# Patient Record
Sex: MALE | Race: ASIAN | NOT HISPANIC OR LATINO | Employment: OTHER | URBAN - METROPOLITAN AREA
[De-identification: names, ages, dates, MRNs, and addresses within clinical notes are randomized per-mention and may not be internally consistent; named-entity substitution may affect disease eponyms.]

---

## 2017-05-09 ENCOUNTER — TELEPHONE (OUTPATIENT)
Dept: INTERNAL MEDICINE | Facility: CLINIC | Age: 52
End: 2017-05-09

## 2017-05-09 ENCOUNTER — OFFICE VISIT (OUTPATIENT)
Dept: INTERNAL MEDICINE | Facility: CLINIC | Age: 52
End: 2017-05-09
Payer: MEDICARE

## 2017-05-09 VITALS — DIASTOLIC BLOOD PRESSURE: 54 MMHG | SYSTOLIC BLOOD PRESSURE: 77 MMHG | TEMPERATURE: 97 F | HEART RATE: 92 BPM

## 2017-05-09 DIAGNOSIS — G71.00 MUSCULAR DYSTROPHY: ICD-10-CM

## 2017-05-09 DIAGNOSIS — L89.213 DECUBITUS ULCER OF RIGHT HIP, STAGE 3: Primary | ICD-10-CM

## 2017-05-09 PROCEDURE — 99999 PR PBB SHADOW E&M-EST. PATIENT-LVL III: CPT | Mod: PBBFAC,,, | Performed by: FAMILY MEDICINE

## 2017-05-09 PROCEDURE — 1160F RVW MEDS BY RX/DR IN RCRD: CPT | Mod: S$GLB,,, | Performed by: FAMILY MEDICINE

## 2017-05-09 PROCEDURE — 99499 UNLISTED E&M SERVICE: CPT | Mod: S$GLB,,, | Performed by: FAMILY MEDICINE

## 2017-05-09 PROCEDURE — 99204 OFFICE O/P NEW MOD 45 MIN: CPT | Mod: S$GLB,,, | Performed by: FAMILY MEDICINE

## 2017-05-09 NOTE — TELEPHONE ENCOUNTER
----- Message from Анна Cosme sent at 5/9/2017  3:33 PM CDT -----  Contact: [facility]  [phone]  Received a referral and they are unable to accept it because they are not in network with the pt's insurance.

## 2017-05-09 NOTE — PROGRESS NOTES
Subjective:       Patient ID: Godwin Jeter is a 51 y.o. male.    Chief Complaint: bed sores and Annual Exam    HPI Comments: He is a new patient, here with his brother and his mother.  His brother acts as the  for the mother. Pt is 49 yo pt with muscular dystrophy, bedbound, who lives at home and is cared for by family, primarily his mother. He had two ED visits in April and June of last year to [facility] which are reivewed. Apparently he was treated at [facility] in March 2016 for fecal impaction and urinary retention and had indwelling catheter for a short period of time. The June visit was for decubitus ulcer. No HH was available after that encounter but the family was able to heal his wounds with their own care. Now he has a persistent ulcer to right hip. They are looking for help with this, but even more, they are looking for help with pt's care. He has 32 hours of  care weekly. The mother is exhausted. She is here today as a pt herself c/o extreme fatigue and other symptoms. They also wonder if they could obtain some kind of reclining wheelchair that would aid in their ability to care for him.    Pt requires all care - he must be fed, bathed, turned. They report there have been no recent changes in his status - no change in appetite, swallowing ability, bowel or bladder habits (he is in depends 24 hours), no temps, no weight change, no URI symptoms, no breathing difficulty. His brother is able to carry the pt and that is how he was transported into the clinic today.    Review of Systems   Constitutional: Negative for activity change, appetite change and fever.   Respiratory: Negative for choking and shortness of breath.    Skin: Positive for wound (bed sores chronic).       Objective:      Physical Exam   Constitutional:   Cachectic individual lying on exam table. Contractures to upper and lower limbs.   HENT:   Head: Normocephalic.   Cardiovascular: Normal rate, regular rhythm and normal  heart sounds.  Exam reveals no friction rub.    No murmur heard.  Pulmonary/Chest: Effort normal and breath sounds normal. No respiratory distress. He has no wheezes.   Abdominal: Soft. He exhibits no distension. There is no tenderness. There is no rebound and no guarding.   Skin: Skin is warm and dry.   Overlying right greater trochanter, full thickness 1.2 x 1.4 cm circular skin defect. No surrounding erythema. To left hip, healed scar from prior decubitus ulcer.         Assessment/Plan:     1. Decubitus ulcer of right hip, stage 3  Ambulatory referral to Home Health   2. Muscular dystrophy  Ambulatory referral to Home Health     HH referral for wound care as well as for medical SW assistance with above requests - reclining wheelchair, any help with further care.  HH orders include request for lab - CBC and CMP - to assess nutritional status.   Pt declined to have blood drawn today due to fatigue and discomfort.

## 2017-05-09 NOTE — MR AVS SNAPSHOT
[address]  [place] [address]  Phone: [phone]  Fax: [phone]                  Godwin Jeter   2017 1:40 PM   Office Visit    Description:  Male : 1965   Provider:  Camille Smallwood MD   Department:  [facility]           Reason for Visit     bed sores     Annual Exam           Diagnoses this Visit        Comments    Decubitus ulcer of right hip, stage 3    -  Primary     Muscular dystrophy                To Do List           Goals (5 Years of Data)     None      [facility]     [facility] On Call Nurse Care Line -  Assistance  Unless otherwise directed by your provider, please contact [facility], our nurse care line that is available for  assistance.     Registered nurses in the [facility] Center provide: appointment scheduling, clinical advisement, health education, and other advisory services.  Call: [phone] (toll free)               Medications           Message regarding Medications     Verify the changes and/or additions to your medication regime listed below are the same as discussed with your clinician today.  If any of these changes or additions are incorrect, please notify your healthcare provider.             Verify that the below list of medications is an accurate representation of the medications you are currently taking.  If none reported, the list may be blank. If incorrect, please contact your healthcare provider. Carry this list with you in case of emergency.           Current Medications     bisacodyl (DULCOLAX) 10 mg Supp Place 1 suppository (10 mg total) rectally daily as needed.    metoclopramide HCl (REGLAN) 5 MG tablet Take 5 mg by mouth 4 (four) times daily.           Clinical Reference Information           Your Vitals Were     BP Pulse Temp             77/54 (BP Location: Left arm, Patient Position: Lying, BP Method: Automatic) 92 97.2 °F (36.2 °C) (Tympanic)         Blood Pressure           Most Recent Value    BP  (!)  77/54      Allergies as of 5/9/2017     No Known Allergies      Immunizations Administered on Date of Encounter - 5/9/2017     None      Orders Placed During Today's Visit      Normal Orders This Visit    Ambulatory referral to Home Health       [facility] Sign-Up     Activating your [facility] account is as easy as 1-2-3!     1) Visit [email or web address], select Sign Up Now, enter this activation code and your date of birth, then select Next.  [email or web address]  Expires: 6/23/2017  3:31 PM      2) Create a username and password to use when you visit [facility] in the future and select a security question in case you lose your password and select Next.    3) Enter your e-mail address and click Sign Up!    Additional Information  If you have questions, please e-mail [email or web address] or call [phone] to talk to our [facility] staff. Remember, [facility] is NOT to be used for urgent needs. For medical emergencies, dial 911.         Language Assistance Services     ATTENTION: Language assistance services are available, free of charge. Please call [phone].      ATENCIÓN: Si habla adonisañol, tiene a douglas disposición servicios gratuitos de asistencia lingüística. Llame al [phone].     CHÚ Ý: N?u b?n nói Ti?ng Vi?t, có các d?ch v? h? tr? ngôn ng? mi?n phí dành cho b?n. G?i s? [phone].         [facility] complies with applicable Federal civil rights laws and does not discriminate on the basis of race, color, national origin, age, disability, or sex.

## 2017-05-12 ENCOUNTER — TELEPHONE (OUTPATIENT)
Dept: INTERNAL MEDICINE | Facility: CLINIC | Age: 52
End: 2017-05-12

## 2017-05-12 NOTE — TELEPHONE ENCOUNTER
Winsome PANDYA from [facility] was calling in regards to Mr. Connelly. She states that  There ins't a signature in the orders and they need a  Signature to process his orders as soon as possible.    Please advise

## 2017-05-12 NOTE — TELEPHONE ENCOUNTER
----- Message from Laura Shearer sent at 5/12/2017  9:50 AM CDT -----  Contact: [facility] calling in regards to a referral for Home Health wound care but the signature was not on the order and they need the doctor to sign or send over a electronic signature in order for them to process this order as soon as possible fax# [phone] attn: [facility] TRIPLETT..[phone] directline

## 2017-05-15 ENCOUNTER — TELEPHONE (OUTPATIENT)
Dept: INTERNAL MEDICINE | Facility: CLINIC | Age: 52
End: 2017-05-15

## 2017-05-15 NOTE — TELEPHONE ENCOUNTER
[facility] approved Authorization number [id number]    [facility]

## 2017-06-05 ENCOUNTER — TELEPHONE (OUTPATIENT)
Dept: INTERNAL MEDICINE | Facility: CLINIC | Age: 52
End: 2017-06-05

## 2017-06-05 NOTE — TELEPHONE ENCOUNTER
Jeana from [facility] states that she went in to see the pt on today and noticed some neon green  Drainage from his wound as was just wanting to inform you of it.     Please Advise

## 2017-06-05 NOTE — TELEPHONE ENCOUNTER
----- Message from Quinaa Garber sent at 6/5/2017  4:11 PM CDT -----  Contact: gee from [facility] she is calling rg having some concerns on the wound. Has a neon green drainage from the wound and can be reached at [phone]//thanks/dbw

## 2017-06-05 NOTE — TELEPHONE ENCOUNTER
----- Message from Quiana Garber sent at 6/5/2017  4:11 PM CDT -----  Contact: gee from [facility] she is calling rg having some concerns on the wound. Has a neon green drainage from the wound and can be reached at [phone]//thanks/dbw

## 2017-06-05 NOTE — TELEPHONE ENCOUNTER
Jeana from [facility] was calling to inform you that Mr. Jeter has some neon colored drainage coming from his wound. She can be reached at [phone].

## 2017-06-06 NOTE — TELEPHONE ENCOUNTER
----- Message from Yaneth Pham sent at 6/6/2017  2:44 PM CDT -----  Heidi with [facility] at [phone]//states she had spoke with your office yesterday//nurse was suppose to call her back after speaking with the dr//has not heard anything yet//please call/thanks/[facility]

## 2017-06-06 NOTE — TELEPHONE ENCOUNTER
Spoke with jeana from [facility], she states that she was calling in regards to Mr. Connelly. I advised her that Dr. Smallwood was out of the office on yesterday and that I would make sure that she gets the message on today. Jeana verbalized understanding of information.

## 2017-06-06 NOTE — TELEPHONE ENCOUNTER
Ask HH: Any other S/S of infection such as erythema/ swelling? Has there been any improvement to wound?    Let me  Know.    Also have HH Review for me the wound care he is being given please - if wound is enlarging, we need to have him see wound care as outpt.

## 2017-06-06 NOTE — TELEPHONE ENCOUNTER
Wound has a neon green drainage, a little redness around it.  Other then the drainage there has been no changes.  Patient  Has no fever  Right now they are doing silver alginate  After cleaning with Sterile water then cover with dry 4x4

## 2017-06-07 ENCOUNTER — TELEPHONE (OUTPATIENT)
Dept: INTERNAL MEDICINE | Facility: CLINIC | Age: 52
End: 2017-06-07

## 2017-06-07 NOTE — TELEPHONE ENCOUNTER
----- Message from Kimmie Felipe sent at 6/7/2017  3:06 PM CDT -----  Contact: [facility]/ JAYCOB Thompson is calling nurse staff regarding orders for a urine test. Jaycob's call back to be advised today .[phone] THANKS

## 2017-06-08 ENCOUNTER — TELEPHONE (OUTPATIENT)
Dept: INTERNAL MEDICINE | Facility: CLINIC | Age: 52
End: 2017-06-08

## 2017-06-08 DIAGNOSIS — R35.0 URINARY FREQUENCY: Primary | ICD-10-CM

## 2017-06-08 DIAGNOSIS — L89.213 DECUBITUS ULCER OF RIGHT HIP, STAGE 3: ICD-10-CM

## 2017-06-08 NOTE — TELEPHONE ENCOUNTER
Orders placed for UA/CNS to be obtained by home health.  Picture measurements received via alternate method from home health regarding patient's decubitus ulcer.  Size of the ulceration has increased from my initial measurements.    Recommend sending to outpatient wound care, [facility] wound care.

## 2017-06-13 ENCOUNTER — TELEPHONE (OUTPATIENT)
Dept: INTERNAL MEDICINE | Facility: CLINIC | Age: 52
End: 2017-06-13

## 2017-06-13 DIAGNOSIS — L89.213 DECUBITUS ULCER OF RIGHT HIP, STAGE 3: Primary | ICD-10-CM

## 2017-06-13 DIAGNOSIS — N30.00 ACUTE CYSTITIS WITHOUT HEMATURIA: Primary | ICD-10-CM

## 2017-06-13 DIAGNOSIS — R35.0 URINARY FREQUENCY: ICD-10-CM

## 2017-06-13 DIAGNOSIS — G71.00 MUSCULAR DYSTROPHY: ICD-10-CM

## 2017-06-13 RX ORDER — SULFAMETHOXAZOLE AND TRIMETHOPRIM 800; 160 MG/1; MG/1
1 TABLET ORAL 2 TIMES DAILY
Qty: 10 TABLET | Refills: 0 | Status: SHIPPED | OUTPATIENT
Start: 2017-06-13 | End: 2017-06-18

## 2017-06-13 NOTE — TELEPHONE ENCOUNTER
Made several attempts to contact the [facility] location at [phone], leave messages and no one returned a call. Called the [place] location of advance wound care at [phone] and they stated to fax the referral to the referral line at [phone]

## 2017-06-13 NOTE — TELEPHONE ENCOUNTER
Okay to start antibiotics but only after home health obtains urine again.  Antibiotics will be called into his pharmacy.  Please inform family that he should not start the antibiotics until home health collects his urine a second time.  The first one was contaminated catch

## 2017-06-13 NOTE — TELEPHONE ENCOUNTER
Called and spoke with Jeana, give verbal orders for Lft's cbc, BMP and urine culture.  Faxed the orders to [phone].  Also give orders to start the abx after getting the urine sample

## 2017-06-13 NOTE — TELEPHONE ENCOUNTER
Please see prior phone call/messages.  Please inform home health that only the urine culture is required. I have canceled the blood work orders.  They were already done on 6/1/17.    I have the results from the 6/1/17 CMP showing all normal values with the exception of albumin slightly low at 3.4.  The CBC shows slight elevation of white cells at 11.1 normal H&H of 15/46.1 and normal platelets of 325.

## 2017-06-13 NOTE — TELEPHONE ENCOUNTER
Called and left a message for the  nurse to cancel the orders for the blood work, but to please get the urine culture

## 2017-06-13 NOTE — TELEPHONE ENCOUNTER
See referral for faxing    Also I have placed orders for labs to be drawn by HH - if he sees wound care promptly, these can be deferred to wound care, but HH can get them at their next visit if no visit to wound care is scheduled within a week.    Please notify HH and family that urine showed multiple colonies - contaminated or delayed transit time. A urine cultureis ordered again for HH to collect please.

## 2017-06-14 ENCOUNTER — TELEPHONE (OUTPATIENT)
Dept: INTERNAL MEDICINE | Facility: CLINIC | Age: 52
End: 2017-06-14

## 2017-06-14 DIAGNOSIS — R53.2 QUADRIPLEGIA, FUNCTIONAL: ICD-10-CM

## 2017-06-14 DIAGNOSIS — G71.00 MUSCULAR DYSTROPHY: Primary | ICD-10-CM

## 2017-06-14 NOTE — TELEPHONE ENCOUNTER
If you can figure out how to order it in system, great - I am unsuccessful thus far.   Contact [facility] please to see how to do it. Thanks.

## 2017-06-14 NOTE — TELEPHONE ENCOUNTER
Monday is his appointment, needs an order for a ambulance transport.  The office is on the third floor

## 2017-06-14 NOTE — TELEPHONE ENCOUNTER
----- Message from Johanna Juárez sent at 6/14/2017  1:33 PM CDT -----  Contact: Patient's sister Kamilah  Ms Kamilah is states her brother needs an order for an ambulance to take him to wound care, please call her back at [phone]. Thank you

## 2017-06-15 ENCOUNTER — TELEPHONE (OUTPATIENT)
Dept: INTERNAL MEDICINE | Facility: CLINIC | Age: 52
End: 2017-06-15

## 2017-06-15 NOTE — TELEPHONE ENCOUNTER
Called and spoke with the sister in law, she has not set up the transportation yet.  Advised her to call and get it set up

## 2017-06-15 NOTE — TELEPHONE ENCOUNTER
----- Message from Alexis Mackay sent at 6/15/2017  9:44 AM CDT -----  Kamilah Jose Rafael, sister in law, #[phone] is returning a missed call.

## 2017-06-15 NOTE — TELEPHONE ENCOUNTER
----- Message from Laura Shearer sent at 6/15/2017 12:15 PM CDT -----  Contact: sister-in-law  Pt sister-in-law is returning Dr Smallwood phone call concerning schedule appointment and need authorization number.....[phone]

## 2017-06-15 NOTE — TELEPHONE ENCOUNTER
Called and left a message for the sister in law stating that I need the name of the ambulance service they will be using

## 2017-06-16 ENCOUNTER — TELEPHONE (OUTPATIENT)
Dept: INTERNAL MEDICINE | Facility: CLINIC | Age: 52
End: 2017-06-16

## 2017-06-30 ENCOUNTER — TELEPHONE (OUTPATIENT)
Dept: INTERNAL MEDICINE | Facility: CLINIC | Age: 52
End: 2017-06-30

## 2017-06-30 NOTE — TELEPHONE ENCOUNTER
Called and spoke with the sister in law.  His appointment for wound care will be Monday at the [place] wound care clinic

## 2017-06-30 NOTE — TELEPHONE ENCOUNTER
----- Message from Rene Joseph sent at 6/29/2017  6:45 PM CDT -----  Contact: Patient  Patient is requesting that for an order to be put in for an ambulance transportation from his wound care clinic. Please contact Patient's Sister In Law at [phone].

## 2017-06-30 NOTE — TELEPHONE ENCOUNTER
Patient was treated empirically with a five-day course of BID Bactrim DS starting 6/13.  I'm unable to find a record of the culture from that initial urinalysis.  Received repeat posttreatment urinalysis collected today 6/30/17 showing positive nitrites, moderate leukocyte esterase, 10-20 white blood cells, 0-1 blood, 2-3 epis, large bacteria.  A culture is being run.  Patient is bedbound and wears a diaper.  The specimens were collected with a condom catheter by the brother.    I spoke with [facility] telephone [phone], fax [phone] this evening and recommended that no repeat antibiotic be prescribed until we have the culture results.  I also advised her I will be out of town until next Friday, 7/7/17.    (The reason the initial UA/C and S was obtained was that the brother had noted bad odor and more frequency to the patient's urination.)    I will address next week when I return, or another abx can be rxd based on the culture report if it comes in earlier.

## 2017-06-30 NOTE — TELEPHONE ENCOUNTER
----- Message from Lesley Michel sent at 6/30/2017 10:31 AM CDT -----  Contact: [facility] [phone]  States that she is calling regarding a coverage decision request. For additional information you will need Member name, ID #[id number] and Auth # [id number]. States that pt was approved for ambulance transportation. Please call back at [phone]//thank you acc

## 2017-07-05 ENCOUNTER — TELEPHONE (OUTPATIENT)
Dept: INTERNAL MEDICINE | Facility: CLINIC | Age: 52
End: 2017-07-05

## 2017-07-05 NOTE — TELEPHONE ENCOUNTER
----- Message from Kina Barton sent at 7/5/2017  8:41 AM CDT -----  Contact: [facility]  Summer called to speak with the nurse about a UA test. She can be contacted at [phone].    Thanks,  Kina

## 2017-07-10 ENCOUNTER — TELEPHONE (OUTPATIENT)
Dept: INTERNAL MEDICINE | Facility: CLINIC | Age: 52
End: 2017-07-10

## 2017-07-10 NOTE — TELEPHONE ENCOUNTER
Urine culture collected 6/30/17 results received.  Results show greater than 100,000 gram-negative rods with mixed morphotypes present, greater than 100,000 gram-positive cocci, and 50,000-100,000 mixed urogenital skin monika.  No sensitivities received.  Note that 3 or more organisms usually indicates contamination during collection or overgrowth due to excessive transport time per report.    This is a repeat test with similar results shown on prior culture.    Please contact pt caregivers re: Antibiotics were called in to pt pharmacy on 6/13/17 after the first culture results rec'd. The abx were not to be started until after the repeat urine cx was collected. Please confirm that they have started the abx after collection of second specimen.

## 2017-07-10 NOTE — TELEPHONE ENCOUNTER
Yes he started on the abx, sister in law believed it was started after the collection of the second urine

## 2017-07-21 ENCOUNTER — TELEPHONE (OUTPATIENT)
Dept: INTERNAL MEDICINE | Facility: CLINIC | Age: 52
End: 2017-07-21

## 2017-07-21 NOTE — TELEPHONE ENCOUNTER
----- Message from Na Escamilla sent at 7/21/2017 11:29 AM CDT -----  Contact: sister in law  States pt need a order for ambulance service for Monday 7/24, pt is going to wound care clinic, please call Kamilah @  [phone] when appt is schedule.

## 2017-07-24 ENCOUNTER — TELEPHONE (OUTPATIENT)
Dept: INTERNAL MEDICINE | Facility: CLINIC | Age: 52
End: 2017-07-24

## 2017-07-24 NOTE — TELEPHONE ENCOUNTER
----- Message from Stephanie Mackay sent at 7/24/2017 10:54 AM CDT -----  [facility]  Calling from [employer] regarding a coverage decision. States the authorization does not meet the request for expedited, but it is approved. The authorization code is [id number]. Please adv/call [phone].//thanks. cw

## 2017-07-24 NOTE — TELEPHONE ENCOUNTER
Spoke with [facility] and states that it was approved, states that Thiago will get in touch with pt and get more information of when services will be needed and where to go.

## 2017-07-24 NOTE — TELEPHONE ENCOUNTER
Left a message for a member of [facility] to return the call in regards to Elaina Steffany.

## 2017-07-24 NOTE — TELEPHONE ENCOUNTER
----- Message from Clarisa Rdz sent at 7/21/2017  3:59 PM CDT -----  Contact: [facility]  please call back at [phone] regarding  Ambulance womb care.

## 2017-07-26 ENCOUNTER — TELEPHONE (OUTPATIENT)
Dept: INTERNAL MEDICINE | Facility: CLINIC | Age: 52
End: 2017-07-26

## 2017-07-26 NOTE — TELEPHONE ENCOUNTER
----- Message from Alexis Mackay sent at 7/26/2017  1:20 PM CDT -----  Marti, [facility], #[phone], states she is calling to check on an order she dropped off on 7/5 to re-cert the pt's home health from 6/30/17.

## 2017-08-11 ENCOUNTER — TELEPHONE (OUTPATIENT)
Dept: INTERNAL MEDICINE | Facility: CLINIC | Age: 52
End: 2017-08-11

## 2017-08-11 NOTE — TELEPHONE ENCOUNTER
Called and spoke with the sister in law and explained that we may not be able to get it done today.  She is calling to see if she can get the appointment rescheduled

## 2017-08-11 NOTE — TELEPHONE ENCOUNTER
----- Message from Khalida Oconnor sent at 8/11/2017 12:18 PM CDT -----  Contact: pt   Pt needs a order for pt to get an ambulance to pick him up on 08/21 for him to go to wound care clinic,,, please call back  [phone]

## 2017-08-11 NOTE — TELEPHONE ENCOUNTER
----- Message from Carmelita Dutta sent at 8/11/2017 10:54 AM CDT -----  Contact: Kamilah (pt's sister)   Kamilah called and stated she needed to speak to the nurse. She stated that the pt needs an order for an ambulance for 8/14/17 to pick him up for wound care. She can be reached at [phone].    Thanks,  TF

## 2017-08-18 ENCOUNTER — TELEPHONE (OUTPATIENT)
Dept: INTERNAL MEDICINE | Facility: CLINIC | Age: 52
End: 2017-08-18

## 2017-08-18 NOTE — TELEPHONE ENCOUNTER
----- Message from Guillermina Gibbs sent at 8/18/2017 12:32 PM CDT -----  Contact: Oscar naiduen - brother  He states that he was returning your call.   Call him at [phone].                                             higginbotham

## 2017-08-18 NOTE — TELEPHONE ENCOUNTER
They were calling to see if the ambulance transportation has been approved.  Advised to call the insurance company

## 2017-08-18 NOTE — TELEPHONE ENCOUNTER
----- Message from Myla Jiménez sent at 8/18/2017 10:56 AM CDT -----  Contact: [facility]ber-[phone]   Pt would like to  Consult with the nurse about wound clinic.  Please call back at [phone].  Thx-AMh

## 2018-03-26 ENCOUNTER — TELEPHONE (OUTPATIENT)
Dept: INTERNAL MEDICINE | Facility: CLINIC | Age: 53
End: 2018-03-26

## 2018-03-26 DIAGNOSIS — G71.00 MUSCULAR DYSTROPHY: Primary | ICD-10-CM

## 2018-03-26 DIAGNOSIS — R53.2 QUADRIPLEGIA, FUNCTIONAL: ICD-10-CM

## 2018-03-26 DIAGNOSIS — L89.90 PRESSURE ULCER, UNSPECIFIED LOCATION, UNSPECIFIED ULCER STAGE: ICD-10-CM

## 2018-03-26 NOTE — TELEPHONE ENCOUNTER
Patient's family is requesting a hospital bed, wheelchair and HH.  Family states that the patient is bed bound ( brother hurt his back and is unable to lift the patient ) he also has another sore stating on his hip.   Please advise

## 2018-03-26 NOTE — TELEPHONE ENCOUNTER
Spoke with the patient sister, she states that she will have her sister in law call the  Office back to give more information about the patient and wether or not he is receiving home health.

## 2018-03-26 NOTE — TELEPHONE ENCOUNTER
The patient is not receiving any Home health Nurse.   The brother who works for [employer] ( care taker) is all they have.

## 2018-03-26 NOTE — TELEPHONE ENCOUNTER
----- Message from Gwen Bejarano sent at 3/26/2018  2:06 PM CDT -----  Contact: Pt's sister  She is calling in regards to a missed call and can be reached at [phone]

## 2018-03-26 NOTE — TELEPHONE ENCOUNTER
Verify that pt does not receive HH- he was referred to HH last May but as I recall he did not use it. I need to know before I can proceed.

## 2018-03-27 NOTE — TELEPHONE ENCOUNTER
----- Message from Yolanda Ontiveros sent at 3/27/2018  9:24 AM CDT -----  Contact: Kamilah sister in law  Returning your call. please call Lee @ [phone]. Thanks, shaheen

## 2018-03-27 NOTE — TELEPHONE ENCOUNTER
Noted. Please include the recent phone messages from family requesting hospital bed and presence of new wound.

## 2018-03-27 NOTE — TELEPHONE ENCOUNTER
[facility]'t take [facility],  [facility] take it case by case   Will fax over the information to them.

## 2018-03-27 NOTE — TELEPHONE ENCOUNTER
Please call the [facility] - I am unable to place a new order due to presence of current referral - 5/9/17 - to HH - can they assess pt - make recs for bed etc? Asking for HH assessment for nursing care for decubitus ulcer; MD; quadriplegia, functional; for wound care, for SW assessment, for eval for aide.

## 2018-03-28 ENCOUNTER — TELEPHONE (OUTPATIENT)
Dept: INTERNAL MEDICINE | Facility: CLINIC | Age: 53
End: 2018-03-28

## 2018-03-28 NOTE — TELEPHONE ENCOUNTER
[facility]   Called and left a message for the sister in law to call the insurance company and see who is in network and to let me know

## 2018-04-20 ENCOUNTER — TELEPHONE (OUTPATIENT)
Dept: INTERNAL MEDICINE | Facility: CLINIC | Age: 53
End: 2018-04-20

## 2018-04-20 NOTE — TELEPHONE ENCOUNTER
Patient family requested that we contact [facility]. ( Ms. Cortés ) at [phone]    Called and spoke with her and she stated that she just helps the family with referrals.  Explained to her that we can't order supplies when we dont know what he needs.  Please contact the family and have them find out who is in network with there insurance company for HH.  Never heard but from anyone.   Family has been told this several times

## 2018-08-07 ENCOUNTER — TELEPHONE (OUTPATIENT)
Dept: INTERNAL MEDICINE | Facility: CLINIC | Age: 53
End: 2018-08-07

## 2018-08-07 NOTE — TELEPHONE ENCOUNTER
Mother and sister here for mother's visit - are asking about supplies - last asked back in March - April. We were going to have HH go and assess via their PT/OT for a transport surface. They did not understand the next step they were asked at that time to let us know which HH is covered.    They are also asking for a lift to place him in a tub. From bed and transport to tub and back.    [facility] us [facility]

## 2018-10-02 ENCOUNTER — TELEPHONE (OUTPATIENT)
Dept: INTERNAL MEDICINE | Facility: CLINIC | Age: 53
End: 2018-10-02

## 2018-10-02 DIAGNOSIS — R53.2 QUADRIPLEGIA, FUNCTIONAL: ICD-10-CM

## 2018-10-02 DIAGNOSIS — G71.00 MUSCULAR DYSTROPHY: Primary | ICD-10-CM

## 2018-10-02 NOTE — TELEPHONE ENCOUNTER
Mother and sister asking for Sahra lift - he was assessed for LT PCA and phone to call [phone] [facility] [address] [place] [address]. He has 32 hours/wk PCA but trying to get longer hours. To call for waiver slot placement and how to expedite.    Also still asking for wheelchair - will need customized features or at least padding to keep head in place and chair needs to lie flat or on a slant.    Staff: Sahra melissa ordered - please contact [facility] to see where it needs to be sent.

## 2018-10-02 NOTE — TELEPHONE ENCOUNTER
Called [employer].  Spoke to Volodymyr and was advised that order can be faxed to [phone].    Spoke to [facility] and was advised that patient does not qualify because he is not in a nursing home or have Jeaneth Gehrig's disease.  Was given another number to call to get patient qualified: [phone] option# 5.  Called the number and was advised that someone will call the office back in regards to waiver slot placement and how to expedite.

## 2019-05-16 ENCOUNTER — HOSPITAL ENCOUNTER (EMERGENCY)
Facility: HOSPITAL | Age: 54
Discharge: HOME | End: 2019-05-16
Attending: EMERGENCY MEDICINE
Payer: MEDICARE

## 2019-05-16 VITALS
OXYGEN SATURATION: 99 % | HEART RATE: 101 BPM | RESPIRATION RATE: 16 BRPM | TEMPERATURE: 98 F | SYSTOLIC BLOOD PRESSURE: 109 MMHG | DIASTOLIC BLOOD PRESSURE: 59 MMHG

## 2019-05-16 DIAGNOSIS — K56.41 FECAL IMPACTION: Primary | ICD-10-CM

## 2019-05-16 DIAGNOSIS — G71.00 MUSCULAR DYSTROPHY: ICD-10-CM

## 2019-05-16 LAB
ALBUMIN SERPL BCP-MCNC: 3.3 G/DL (ref 3.5–5.2)
ALP SERPL-CCNC: 72 U/L (ref 55–135)
ALT SERPL W/O P-5'-P-CCNC: 9 U/L (ref 10–44)
ANION GAP SERPL CALC-SCNC: 11 MMOL/L (ref 8–16)
AST SERPL-CCNC: 16 U/L (ref 10–40)
BACTERIA #/AREA URNS HPF: NORMAL /HPF
BASOPHILS # BLD AUTO: 0.01 K/UL (ref 0–0.2)
BASOPHILS NFR BLD: 0.1 % (ref 0–1.9)
BILIRUB SERPL-MCNC: 0.5 MG/DL (ref 0.1–1)
BILIRUB UR QL STRIP: NEGATIVE
BUN SERPL-MCNC: 23 MG/DL (ref 6–20)
CALCIUM SERPL-MCNC: 9 MG/DL (ref 8.7–10.5)
CHLORIDE SERPL-SCNC: 107 MMOL/L (ref 95–110)
CLARITY UR: CLEAR
CO2 SERPL-SCNC: 22 MMOL/L (ref 23–29)
COLOR UR: YELLOW
CREAT SERPL-MCNC: 0.9 MG/DL (ref 0.5–1.4)
DIFFERENTIAL METHOD: ABNORMAL
EOSINOPHIL # BLD AUTO: 0 K/UL (ref 0–0.5)
EOSINOPHIL NFR BLD: 0.2 % (ref 0–8)
ERYTHROCYTE [DISTWIDTH] IN BLOOD BY AUTOMATED COUNT: 12.7 % (ref 11.5–14.5)
EST. GFR  (AFRICAN AMERICAN): >60 ML/MIN/1.73 M^2
EST. GFR  (NON AFRICAN AMERICAN): >60 ML/MIN/1.73 M^2
GLUCOSE SERPL-MCNC: 160 MG/DL (ref 70–110)
GLUCOSE UR QL STRIP: NEGATIVE
HCT VFR BLD AUTO: 39.5 % (ref 40–54)
HGB BLD-MCNC: 13 G/DL (ref 14–18)
HGB UR QL STRIP: ABNORMAL
KETONES UR QL STRIP: NEGATIVE
LEUKOCYTE ESTERASE UR QL STRIP: NEGATIVE
LYMPHOCYTES # BLD AUTO: 0.9 K/UL (ref 1–4.8)
LYMPHOCYTES NFR BLD: 7.9 % (ref 18–48)
MCH RBC QN AUTO: 29.1 PG (ref 27–31)
MCHC RBC AUTO-ENTMCNC: 32.9 G/DL (ref 32–36)
MCV RBC AUTO: 89 FL (ref 82–98)
MICROSCOPIC COMMENT: NORMAL
MONOCYTES # BLD AUTO: 1.2 K/UL (ref 0.3–1)
MONOCYTES NFR BLD: 10.1 % (ref 4–15)
NEUTROPHILS # BLD AUTO: 9.4 K/UL (ref 1.8–7.7)
NEUTROPHILS NFR BLD: 81.7 % (ref 38–73)
NITRITE UR QL STRIP: NEGATIVE
PH UR STRIP: 6 [PH] (ref 5–8)
PLATELET # BLD AUTO: 243 K/UL (ref 150–350)
PMV BLD AUTO: 9.2 FL (ref 9.2–12.9)
POTASSIUM SERPL-SCNC: 3.6 MMOL/L (ref 3.5–5.1)
PROT SERPL-MCNC: 7.4 G/DL (ref 6–8.4)
PROT UR QL STRIP: NEGATIVE
RBC # BLD AUTO: 4.46 M/UL (ref 4.6–6.2)
RBC #/AREA URNS HPF: 1 /HPF (ref 0–4)
SODIUM SERPL-SCNC: 140 MMOL/L (ref 136–145)
SP GR UR STRIP: 1.01 (ref 1–1.03)
SQUAMOUS #/AREA URNS HPF: 1 /HPF
URN SPEC COLLECT METH UR: ABNORMAL
UROBILINOGEN UR STRIP-ACNC: NEGATIVE EU/DL
WBC # BLD AUTO: 11.45 K/UL (ref 3.9–12.7)
WBC #/AREA URNS HPF: 1 /HPF (ref 0–5)

## 2019-05-16 PROCEDURE — 25000003 PHARM REV CODE 250: Performed by: EMERGENCY MEDICINE

## 2019-05-16 PROCEDURE — 80053 COMPREHEN METABOLIC PANEL: CPT

## 2019-05-16 PROCEDURE — 85025 COMPLETE CBC W/AUTO DIFF WBC: CPT

## 2019-05-16 PROCEDURE — 36415 COLL VENOUS BLD VENIPUNCTURE: CPT

## 2019-05-16 PROCEDURE — 99284 EMERGENCY DEPT VISIT MOD MDM: CPT

## 2019-05-16 PROCEDURE — 81000 URINALYSIS NONAUTO W/SCOPE: CPT

## 2019-05-16 RX ORDER — POLYETHYLENE GLYCOL 3350 17 G/17G
17 POWDER, FOR SOLUTION ORAL DAILY
Status: DISCONTINUED | OUTPATIENT
Start: 2019-05-17 | End: 2019-05-16 | Stop reason: HOSPADM

## 2019-05-16 RX ORDER — DOCUSATE SODIUM 100 MG/1
100 CAPSULE, LIQUID FILLED ORAL 2 TIMES DAILY
Qty: 60 CAPSULE | Refills: 0 | Status: SHIPPED | OUTPATIENT
Start: 2019-05-16

## 2019-05-16 RX ORDER — POLYETHYLENE GLYCOL 3350 17 G/17G
17 POWDER, FOR SOLUTION ORAL DAILY
Qty: 289 G | Refills: 0 | Status: SHIPPED | OUTPATIENT
Start: 2019-05-16

## 2019-05-16 RX ORDER — SYRING-NEEDL,DISP,INSUL,0.3 ML 29 G X1/2"
296 SYRINGE, EMPTY DISPOSABLE MISCELLANEOUS
Status: COMPLETED | OUTPATIENT
Start: 2019-05-16 | End: 2019-05-16

## 2019-05-16 RX ORDER — PSEUDOEPHEDRINE/ACETAMINOPHEN 30MG-500MG
100 TABLET ORAL
Status: COMPLETED | OUTPATIENT
Start: 2019-05-16 | End: 2019-05-16

## 2019-05-16 RX ADMIN — SODIUM CHLORIDE 500 ML: 0.9 INJECTION, SOLUTION INTRAVENOUS at 05:05

## 2019-05-16 RX ADMIN — SODIUM PHOSPHATE, DIBASIC AND SODIUM PHOSPHATE, MONOBASIC 1 ENEMA: 7; 19 ENEMA RECTAL at 02:05

## 2019-05-16 RX ADMIN — Medication 296 ML: at 05:05

## 2019-05-16 RX ADMIN — Medication 100 ML: at 05:05

## 2019-05-16 RX ADMIN — SODIUM CHLORIDE 500 ML: 0.9 INJECTION, SOLUTION INTRAVENOUS at 02:05

## 2019-05-16 RX ADMIN — SODIUM CHLORIDE 1000 ML: 0.9 INJECTION, SOLUTION INTRAVENOUS at 04:05

## 2019-05-16 NOTE — ED PROVIDER NOTES
Encounter Date: 5/16/2019       History     Chief Complaint   Patient presents with    Abdominal Pain     HPI  Review of patient's allergies indicates:  No Known Allergies  Past Medical History:   Diagnosis Date    Muscular dystrophy      History reviewed. No pertinent surgical history.  Family History   Problem Relation Age of Onset    Hypertension Mother     Aneurysm Father      Social History     Tobacco Use    Smoking status: Never Smoker   Substance Use Topics    Alcohol use: No    Drug use: No     Review of Systems    Physical Exam     Initial Vitals [05/16/19 1216]   BP Pulse Resp Temp SpO2   90/60 (!) 120 18 97.7 °F (36.5 °C) 98 %      MAP       --         Physical Exam    ED Course   Procedures  Labs Reviewed   CBC W/ AUTO DIFFERENTIAL - Abnormal; Notable for the following components:       Result Value    RBC 4.46 (*)     Hemoglobin 13.0 (*)     Hematocrit 39.5 (*)     Gran # (ANC) 9.4 (*)     Lymph # 0.9 (*)     Mono # 1.2 (*)     Gran% 81.7 (*)     Lymph% 7.9 (*)     All other components within normal limits   COMPREHENSIVE METABOLIC PANEL - Abnormal; Notable for the following components:    CO2 22 (*)     Glucose 160 (*)     BUN, Bld 23 (*)     Albumin 3.3 (*)     ALT 9 (*)     All other components within normal limits   URINALYSIS - Abnormal; Notable for the following components:    Occult Blood UA 1+ (*)     All other components within normal limits   URINALYSIS MICROSCOPIC          Imaging Results          X-Ray Abdomen AP with Left Decub (Final result)  Result time 05/16/19 13:51:20   Procedure changed from X-Ray Abdomen Flat And Erect     Final result by Brennan Kong Jr., MD (05/16/19 13:51:20)                 Impression:      Mild air distention of bowel loops.  Fecal material.  Consider treatment for constipation and follow-up imaging.      Electronically signed by: Brennan Kong MD  Date:    05/16/2019  Time:    13:51             Narrative:    EXAMINATION:  XR ABDOMEN AP WITH LEFT  DECUB    CLINICAL HISTORY:  constipation;    COMPARISON:  04/18/2016    FINDINGS:  Large amount of colonic fecal material present, most prominent in the ascending colon and rectosigmoid regions.  Air distention of some small bowel loops as well as the descending colon.  Bowel pattern appears mostly similar to the remote prior exam.                                 Medical Decision Making:   Clinical Tests:   Lab Tests: Ordered and Reviewed  Radiological Study: Reviewed and Ordered                      Clinical Impression:

## 2019-05-16 NOTE — ED PROVIDER NOTES
SCRIBE #1 NOTE: I, Robina Ralph, am scribing for, and in the presence of, Eleno Rdz Jr., MD. I have scribed the entire note.       History     Chief Complaint   Patient presents with    Abdominal Pain     Review of patient's allergies indicates:  No Known Allergies      History of Present Illness     HPI    5/16/2019, 12:36 PM  History obtained from care-taker      History of Present Illness: Godwin Jeter is a 53 y.o. male patient with a PMHx of muscular dystrophy who presents to the Emergency Department for evaluation of abdominal pain which onset 2-3 days ago. Caretaker reports patient has had small amount of stool per bowel movements. Care-taker reports use of enima for normal BM  Symptoms are constant and moderate in severity. No mitigating or exacerbating factors reported. Associated sxs include abd distention and constipation. Patient denies any fever, chills, SOB, CP, N/V/D, dysuria, hematuria, hematochezia, back pain and all other sxs at this time. No further complaints or concerns at this time.         Arrival mode: Ambulance    PCP: Camille Smallwood MD        Past Medical History:  Past Medical History:   Diagnosis Date    Muscular dystrophy        Past Surgical History:  No past surgical history on file.      Family History:  Family History   Problem Relation Age of Onset    Hypertension Mother     Aneurysm Father        Social History:  Social History     Tobacco Use    Smoking status: Never Smoker   Substance and Sexual Activity    Alcohol use: No    Drug use: No    Sexual activity: Never        Review of Systems     Review of Systems   Constitutional: Negative for chills and fever.   HENT: Negative for sore throat.    Respiratory: Negative for shortness of breath.    Cardiovascular: Negative for chest pain.   Gastrointestinal: Positive for abdominal distention, abdominal pain and constipation. Negative for diarrhea, nausea and vomiting.   Genitourinary: Negative for dysuria and  hematuria.        (-) hematochezia   Musculoskeletal: Negative for back pain.   Skin: Negative for rash.   Neurological: Negative for weakness.   Hematological: Does not bruise/bleed easily.   All other systems reviewed and are negative.       Physical Exam     Initial Vitals [05/16/19 1216]   BP Pulse Resp Temp SpO2   90/60 (!) 120 18 97.7 °F (36.5 °C) 98 %      MAP       --          Physical Exam  Nursing Notes and Vital Signs Reviewed.  Constitutional: Patient is in mild distress. Well-developed and well-nourished. Bed bound and debilitated secondary to muscular distrophy.   Head: Atraumatic. Normocephalic.  Eyes: PERRL. EOM intact. Conjunctivae are not pale. No scleral icterus.  ENT: Mucous membranes are dry. Oropharynx is clear and symmetric.    Neck: Supple. Full ROM. No lymphadenopathy.  Cardiovascular: Regular rate. Regular rhythm. No murmurs, rubs, or gallops. Distal pulses are 2+ and symmetric.  Pulmonary/Chest: No respiratory distress. Clear to auscultation bilaterally. No wheezing or rales.  Abdominal: Distended lower abd region.  There is no tenderness.  No rebound, guarding, or rigidity. Good bowel sounds.  Rectal: Female chaperone present for the duration of the rectal exam.There is no tenderness. No masses or hemorrhoids. Normal sphincter tone. Impaction of stool. Stool coming out of rectum. After disimpacting rectum, abd slightly distended.   Genitourinary: No CVA tenderness  Musculoskeletal: Moves all extremities. No obvious deformities. No edema. No calf tenderness.  Skin: Warm and dry.  Neurological:  Alert, awake, and appropriate.  Normal speech.  No acute focal neurological deficits are appreciated.  Psychiatric: Normal affect. Good eye contact. Appropriate in content.       ED Course   Procedures  ED Vital Signs:  Vitals:    05/16/19 1216   BP: 90/60   Pulse: (!) 120   Resp: 18   Temp: 97.7 °F (36.5 °C)   TempSrc: Oral   SpO2: 98%       Abnormal Lab Results:  Labs Reviewed - No data to display      All Lab Results:  Vitals:    05/16/19 1801   BP: 108/76   Pulse: 104   Resp: 18   Temp:        Imaging Results          X-Ray Abdomen AP with Left Decub (Final result)  Result time 05/16/19 13:51:20   Procedure changed from X-Ray Abdomen Flat And Erect     Final result by Brennan Kong Jr., MD (05/16/19 13:51:20)                 Impression:      Mild air distention of bowel loops.  Fecal material.  Consider treatment for constipation and follow-up imaging.      Electronically signed by: Brennan Kong MD  Date:    05/16/2019  Time:    13:51             Narrative:    EXAMINATION:  XR ABDOMEN AP WITH LEFT DECUB    CLINICAL HISTORY:  constipation;    COMPARISON:  04/18/2016    FINDINGS:  Large amount of colonic fecal material present, most prominent in the ascending colon and rectosigmoid regions.  Air distention of some small bowel loops as well as the descending colon.  Bowel pattern appears mostly similar to the remote prior exam.                                Results for orders placed or performed during the hospital encounter of 05/16/19   CBC auto differential   Result Value Ref Range    WBC 11.45 3.90 - 12.70 K/uL    RBC 4.46 (L) 4.60 - 6.20 M/uL    Hemoglobin 13.0 (L) 14.0 - 18.0 g/dL    Hematocrit 39.5 (L) 40.0 - 54.0 %    Mean Corpuscular Volume 89 82 - 98 fL    Mean Corpuscular Hemoglobin 29.1 27.0 - 31.0 pg    Mean Corpuscular Hemoglobin Conc 32.9 32.0 - 36.0 g/dL    RDW 12.7 11.5 - 14.5 %    Platelets 243 150 - 350 K/uL    MPV 9.2 9.2 - 12.9 fL    Gran # (ANC) 9.4 (H) 1.8 - 7.7 K/uL    Lymph # 0.9 (L) 1.0 - 4.8 K/uL    Mono # 1.2 (H) 0.3 - 1.0 K/uL    Eos # 0.0 0.0 - 0.5 K/uL    Baso # 0.01 0.00 - 0.20 K/uL    Gran% 81.7 (H) 38.0 - 73.0 %    Lymph% 7.9 (L) 18.0 - 48.0 %    Mono% 10.1 4.0 - 15.0 %    Eosinophil% 0.2 0.0 - 8.0 %    Basophil% 0.1 0.0 - 1.9 %    Differential Method Automated    Comprehensive metabolic panel   Result Value Ref Range    Sodium 140 136 - 145 mmol/L    Potassium 3.6 3.5  - 5.1 mmol/L    Chloride 107 95 - 110 mmol/L    CO2 22 (L) 23 - 29 mmol/L    Glucose 160 (H) 70 - 110 mg/dL    BUN, Bld 23 (H) 6 - 20 mg/dL    Creatinine 0.9 0.5 - 1.4 mg/dL    Calcium 9.0 8.7 - 10.5 mg/dL    Total Protein 7.4 6.0 - 8.4 g/dL    Albumin 3.3 (L) 3.5 - 5.2 g/dL    Total Bilirubin 0.5 0.1 - 1.0 mg/dL    Alkaline Phosphatase 72 55 - 135 U/L    AST 16 10 - 40 U/L    ALT 9 (L) 10 - 44 U/L    Anion Gap 11 8 - 16 mmol/L    eGFR if African American >60 >60 mL/min/1.73 m^2    eGFR if non African American >60 >60 mL/min/1.73 m^2   Urinalysis   Result Value Ref Range    Specimen UA Urine, Clean Catch     Color, UA Yellow Yellow, Straw, Katya    Appearance, UA Clear Clear    pH, UA 6.0 5.0 - 8.0    Specific Gravity, UA 1.015 1.005 - 1.030    Protein, UA Negative Negative    Glucose, UA Negative Negative    Ketones, UA Negative Negative    Bilirubin (UA) Negative Negative    Occult Blood UA 1+ (A) Negative    Nitrite, UA Negative Negative    Urobilinogen, UA Negative <2.0 EU/dL    Leukocytes, UA Negative Negative   Urinalysis Microscopic   Result Value Ref Range    RBC, UA 1 0 - 4 /hpf    WBC, UA 1 0 - 5 /hpf    Bacteria Rare None-Occ /hpf    Squam Epithel, UA 1 /hpf    Microscopic Comment SEE COMMENT          Imaging Results:  Imaging Results    None            The Emergency Provider reviewed the vital signs and test results, which are outlined above.     ED Discussion     After second enema pt with moderate large bowel movement feeling better will dc home     ED Medication(s):  Medications - No data to display    New Prescriptions    No medications on file                             Scribe Attestation:   Scribe #1: I performed the above scribed service and the documentation accurately describes the services I performed. I attest to the accuracy of the note.     Attending:   Physician Attestation Statement for Scribe #1: I, Eleno Rdz Jr., MD, personally performed the services described in this documentation,  as scribed by Robina Ralph, in my presence, and it is both accurate and complete.           Clinical Impression       ICD-10-CM ICD-9-CM   1. Fecal impaction K56.41 560.32   2. Muscular dystrophy G71.00 359.1         Disposition:   Disposition: Discharged  Condition: Stable         Eleno Rdz Jr., MD  05/16/19 [id number]

## 2019-05-17 NOTE — ED NOTES
Pt incontinent of bowels x 1. Cleaned pt and placed in clean diaper. Offered to call ambulance for transport, pt's family adamant that they can bring pt home in their van. Pt's brother placed pt in personal vehicle.

## 2019-06-25 ENCOUNTER — TELEPHONE (OUTPATIENT)
Dept: INTERNAL MEDICINE | Facility: CLINIC | Age: 54
End: 2019-06-25

## 2019-06-25 NOTE — TELEPHONE ENCOUNTER
Howard walked in stating that he needs an medical eligibility determination form filled out by Friday for the patient.  Howard left the  information:       Rashad Theodore  Phone#: [phone]  Fax#: [phone]    I did advise Howard that a visit maybe needed since  has not seen the patient since  05/09/2017, but will place forms on  desk for review.  Howard stated ok.

## 2019-06-25 NOTE — TELEPHONE ENCOUNTER
L/M at both numbers that I will be returning call again - would like to save them a trip with Mr Godwin Jeter. We can answer many of the questions over the phone.

## 2019-06-26 NOTE — TELEPHONE ENCOUNTER
----- Message from Gwen Lomax sent at 6/26/2019  3:14 PM CDT -----  Contact: Patient   Type:  Patient Returning Call    Who Called: Godwin  Who Left Message for Patient: Dr. Smallwood   Does the patient know what this is regarding?: Paper work  Would the patient rather a call back or a response via [facility]? Call back  Best Call Back Number: Please call him at [phone]  Additional Information: n/a

## 2019-06-26 NOTE — TELEPHONE ENCOUNTER
I received the form 90 L for Medicaid eligibility determination dropped off yesterday.  Left a message at the case workers # today. (I believe this  is with [employer] case management, his Medicaid )     Had left messages at Sister Leticia Jeter's # yesterday.  I spoke with her today and she states I should talk to Froylan, patient's other sister who is currently caring for him.  Her #[phone].  Left message for her to call me.    The 90 L is for caretaker services.   Also spoke with the care home at [facility].  The family has asked for hospice care.  I need to speak with the family 1st before ordering that.  Patient last seen by me 2 years ago.  He was seen last month at the emergency room and we can use that visit as his current physical/status.

## 2019-06-26 NOTE — TELEPHONE ENCOUNTER
Pt states he was trying to return your phone call to answer necessary questions for paperwork to be completed. States he will be keeping his phone next to him to receive the call.

## 2019-06-26 NOTE — TELEPHONE ENCOUNTER
----- Message from Johanna Suárez sent at 6/26/2019 12:21 PM CDT -----  Contact: Clarisa- [facility]  Ms Mckenna states that patients family is requesting hospice so they need an order and history and physical faxed to them [phone] or call her back at [phone]. Thank you

## 2019-06-27 NOTE — TELEPHONE ENCOUNTER
Please fax the 90 L form and then file to epic.    Spoke with krystin, patient's sister-in-law at [phone].  Her , Howard, is the patient's brother.  They perform his care.  Filled out the 90 L form and will fax to Chayo Jaimes, , today at [phone].    Left message for the  yesterday.  I still need to talk to her regarding the requested referral for hospice.  Patient is looking for long-term care.

## 2019-07-01 ENCOUNTER — TELEPHONE (OUTPATIENT)
Dept: INTERNAL MEDICINE | Facility: CLINIC | Age: 54
End: 2019-07-01

## 2019-07-01 NOTE — TELEPHONE ENCOUNTER
L/M with Clarisa at [facility] and with pt  Rashad Jaimes to pls call me back. Pt not hospice candidate except perhaps in terms of failure to thrive. I need more info from the  about why he was referred.

## 2019-07-01 NOTE — TELEPHONE ENCOUNTER
Clarisa states pt has medicaid  referred him due to pain and states the pt is in so much pain he cannot come to clinic. Pt lost a sig amount of wait and now is on a pureed diet and is unable to talk. Clarisa states he meets the criteria according to Medicaid standards.

## 2019-07-01 NOTE — TELEPHONE ENCOUNTER
----- Message from Roro Daigle sent at 7/1/2019  2:47 PM CDT -----  Contact: Terrance Welch Zplwchj-[phone]  .Type:  Patient Returning Call    Who Called:Clarisa   Who Left Message for Patient:Olayinka (unsure)  Does the patient know what this is regarding?:unsure  Would the patient rather a call back or a response via [facility]? Call back   Best Call Back Number:[phone]  Additional Information:

## 2019-07-01 NOTE — TELEPHONE ENCOUNTER
----- Message from Shelby Lomax sent at 7/1/2019 11:06 AM CDT -----  Clarisa kwan/ Hospice is calling in regards to getting an order for Hospice, pt History physical along with demographic sheet fax to [phone]. Caller can be reached at [phone]

## 2019-07-01 NOTE — TELEPHONE ENCOUNTER
Clarisa from [facility] wants orders for the patient for hospice, pt history and physical along with demographic sheet faxed over to Fax#[phone], Phone#[phone]. Patient last seen on 05/09/17.      Please Advise

## 2019-07-03 NOTE — TELEPHONE ENCOUNTER
I spoke with his .  She is trying to find a way for him to get in-house medical assessments.  Since hospice would offer physician oversight in the house this was offered as an option.  My understanding from the family is that his status is not changing, he is not having a degradation in his chronic condition.  He does not currently have a condition requiring skilled nursing so does not currently qualify for .    He would not meet hospice criteria therefore (expectation of death within a year) per subsequent conversation with Clarisa at [facility].  She did recommend [facility] [phone] which does house calls using NPs.  Have placed a call to them to see if they take his insurance.    His  is working on waiver service to increase his current 30 hrs of aide/week to max 72 hrs. She is Rashad Jaimes with [facility] [employer]. Phone#: [phone], Fax#: [phone]

## 2019-07-05 NOTE — TELEPHONE ENCOUNTER
I spoke with Mis on 7/3/19: they said because he has [employer] he will need an authorization to receive medical home visits from [facility]. Placed a call to [employer] but could not continue to hold after being transferred several times.    Will pursue on return to office 7/9/19.

## 2019-07-11 NOTE — TELEPHONE ENCOUNTER
Please print out 1 of the [employer] authorization requests for me to fill out to obtain a referral to [facility] for home visits.

## 2019-10-16 ENCOUNTER — TELEPHONE (OUTPATIENT)
Dept: INTERNAL MEDICINE | Facility: CLINIC | Age: 54
End: 2019-10-16

## 2019-10-16 NOTE — TELEPHONE ENCOUNTER
They have used [facility] in the past. Milana will ask if family has any preference - if no problems with [facility] I will go with them. Milana will ask.    Milana also informed that pt will require a visit - do we have anyone who can see him at his home? - they will need to arrange ambulance service.     Await call back from Milana to write HH order.

## 2019-10-16 NOTE — TELEPHONE ENCOUNTER
Milana from [facility] called requesting order for HH for assistance with a bed sore pt currently has. States she has found the following list of HH facilities that accept pt's insurance.     [facility].     Cell phone

## 2019-10-16 NOTE — TELEPHONE ENCOUNTER
----- Message from Na Escamilla sent at 10/16/2019  1:11 PM CDT -----  Contact: Milana/Katerina Tipton  Please call Milana @ [phone] regarding pt home health prescriptions, nurse will discuss with nurse.

## 2019-10-18 NOTE — TELEPHONE ENCOUNTER
Call to check with Milana whether family had agreed to [facility] or wanted a different home health at this time.  Milana says they have not gotten back with her yet.  The siblings were going to discuss it.

## 2019-11-04 ENCOUNTER — TELEPHONE (OUTPATIENT)
Dept: INTERNAL MEDICINE | Facility: CLINIC | Age: 54
End: 2019-11-04

## 2019-11-04 NOTE — TELEPHONE ENCOUNTER
Please place a call to Milana with [facility] to find out status of HH request from family. The family was going to determine if they had a preference for going with [facility] whom they have used in past or with a different HH.    What is the status of his pressure ulcer now?    Pls let me know.

## 2019-11-04 NOTE — TELEPHONE ENCOUNTER
LOV: 9/1/17    JL-  Surgery referral ordered 9/1/17 for Dr. James Rausch for umbilical hernia, ok to re-order same referral as pt never saw Dr. James Rausch last year? Left detailed msg requesting call back regarding pr's HH orders and current pressure ulcer status.

## 2021-11-09 ENCOUNTER — TELEPHONE (OUTPATIENT)
Dept: FAMILY MEDICINE | Facility: CLINIC | Age: 56
End: 2021-11-09
Payer: MEDICARE

## 2021-11-24 ENCOUNTER — TELEPHONE (OUTPATIENT)
Dept: ADMINISTRATIVE | Facility: HOSPITAL | Age: 56
End: 2021-11-24
Payer: MEDICARE

## 2021-11-30 ENCOUNTER — TELEPHONE (OUTPATIENT)
Dept: FAMILY MEDICINE | Facility: CLINIC | Age: 56
End: 2021-11-30
Payer: MEDICARE

## 2023-01-17 ENCOUNTER — PATIENT OUTREACH (OUTPATIENT)
Dept: ADMINISTRATIVE | Facility: HOSPITAL | Age: 58
End: 2023-01-17
Payer: MEDICARE

## 2023-09-26 ENCOUNTER — TELEPHONE (OUTPATIENT)
Dept: INTERNAL MEDICINE | Facility: CLINIC | Age: 58
End: 2023-09-26
Payer: MEDICARE

## 2023-09-26 NOTE — TELEPHONE ENCOUNTER
----- Message from Estuardo Palacios MA sent at 9/25/2023  1:36 PM CDT -----  Contact: [phone]  Patient called              In regards to needing a NP appointment scheduled as a (virtual visit ) soon as possible with provider. Patient is bed bound.              Call back [phone]

## 2023-09-26 NOTE — TELEPHONE ENCOUNTER
Spoke with caregiver (pt is nonverbal), notified him EMS will have to transport pt because we cannot do an establish care visit on virtual. They will speak with the family and call back to let us know if they would like to schedule an appt.

## 2023-09-29 ENCOUNTER — TELEPHONE (OUTPATIENT)
Dept: ADMINISTRATIVE | Facility: HOSPITAL | Age: 58
End: 2023-09-29
Payer: MEDICARE

## 2023-09-29 NOTE — TELEPHONE ENCOUNTER
I would think this patient would be better served by the [facility] team. Appears was previous [facility] patient. Maybe check with Dr. Lomax/Keren regarding options. Thank you

## 2023-09-29 NOTE — TELEPHONE ENCOUNTER
Received mess from [facility]. This pt is assigned to Dr Bridges and needs to be seen to est care and get a physical. Pt is bed bound and would need to be transported per EMS. They would like dr to write an order for EMS and send it to [facility] to approve.

## 2023-10-02 NOTE — TELEPHONE ENCOUNTER
[facility] notified that per pt sister they did not choose Dr Bridges as pcp and they wanted to stay with old pcp-Dr Gutierrez.

## 2025-05-10 ENCOUNTER — HOSPITAL ENCOUNTER (INPATIENT)
Facility: HOSPITAL | Age: 60
LOS: 2 days | Discharge: HOSPICE | DRG: 871 | End: 2025-05-13
Attending: EMERGENCY MEDICINE | Admitting: STUDENT IN AN ORGANIZED HEALTH CARE EDUCATION/TRAINING PROGRAM
Payer: MEDICARE

## 2025-05-10 DIAGNOSIS — Z13.6 SCREENING FOR CARDIOVASCULAR CONDITION: ICD-10-CM

## 2025-05-10 DIAGNOSIS — R07.9 CHEST PAIN: ICD-10-CM

## 2025-05-10 DIAGNOSIS — A41.9 SEPSIS: ICD-10-CM

## 2025-05-10 DIAGNOSIS — J18.9 PNEUMONIA OF RIGHT LUNG DUE TO INFECTIOUS ORGANISM, UNSPECIFIED PART OF LUNG: Primary | ICD-10-CM

## 2025-05-10 LAB
ABORH RETYPE: NORMAL
ABSOLUTE NEUTROPHIL MANUAL (OHS): 26.1 K/UL
ALBUMIN SERPL BCP-MCNC: 3.1 G/DL (ref 3.5–5.2)
ALP SERPL-CCNC: 57 UNIT/L (ref 40–150)
ALT SERPL W/O P-5'-P-CCNC: 14 UNIT/L (ref 10–44)
ANION GAP (OHS): 8 MMOL/L (ref 8–16)
AST SERPL-CCNC: 14 UNIT/L (ref 11–45)
BACTERIA #/AREA URNS AUTO: ABNORMAL /HPF
BILIRUB SERPL-MCNC: 0.4 MG/DL (ref 0.1–1)
BILIRUB UR QL STRIP.AUTO: NEGATIVE
BUN SERPL-MCNC: 21 MG/DL (ref 6–20)
CALCIUM SERPL-MCNC: 8.2 MG/DL (ref 8.7–10.5)
CHLORIDE SERPL-SCNC: 109 MMOL/L (ref 95–110)
CLARITY UR: CLEAR
CO2 SERPL-SCNC: 19 MMOL/L (ref 23–29)
COLOR UR AUTO: YELLOW
CREAT SERPL-MCNC: 0.7 MG/DL (ref 0.5–1.4)
ERYTHROCYTE [DISTWIDTH] IN BLOOD BY AUTOMATED COUNT: 12.3 % (ref 11.5–14.5)
GFR SERPLBLD CREATININE-BSD FMLA CKD-EPI: >60 ML/MIN/1.73/M2
GLUCOSE SERPL-MCNC: 255 MG/DL (ref 70–110)
GLUCOSE UR QL STRIP: ABNORMAL
HCT VFR BLD AUTO: 37.5 % (ref 40–54)
HCV AB SERPL QL IA: NEGATIVE
HGB BLD-MCNC: 12.4 GM/DL (ref 14–18)
HGB UR QL STRIP: ABNORMAL
HIV 1+2 AB+HIV1 P24 AG SERPL QL IA: NEGATIVE
HOLD SPECIMEN: NORMAL
HOLD SPECIMEN: NORMAL
INDIRECT COOMBS: NORMAL
KETONES UR QL STRIP: ABNORMAL
LACTATE SERPL-SCNC: 2.2 MMOL/L (ref 0.5–2.2)
LEUKOCYTE ESTERASE UR QL STRIP: NEGATIVE
LYMPHOCYTES NFR BLD MANUAL: 1 % (ref 18–48)
MCH RBC QN AUTO: 30.1 PG (ref 27–31)
MCHC RBC AUTO-ENTMCNC: 33.1 G/DL (ref 32–36)
MCV RBC AUTO: 91 FL (ref 82–98)
MICROSCOPIC COMMENT: ABNORMAL
MONOCYTES NFR BLD MANUAL: 4 % (ref 4–15)
NEUTROPHILS NFR BLD MANUAL: 94 % (ref 38–73)
NEUTS BAND NFR BLD MANUAL: 1 %
NITRITE UR QL STRIP: NEGATIVE
NUCLEATED RBC (/100WBC) (OHS): 0 /100 WBC
PH UR STRIP: 7 [PH]
PLATELET # BLD AUTO: 270 K/UL (ref 150–450)
PLATELET BLD QL SMEAR: ABNORMAL
PMV BLD AUTO: 8.9 FL (ref 9.2–12.9)
POTASSIUM SERPL-SCNC: 3.7 MMOL/L (ref 3.5–5.1)
PROT SERPL-MCNC: 7 GM/DL (ref 6–8.4)
PROT UR QL STRIP: ABNORMAL
RBC # BLD AUTO: 4.12 M/UL (ref 4.6–6.2)
RBC #/AREA URNS AUTO: >100 /HPF (ref 0–4)
RH BLD: NORMAL
SODIUM SERPL-SCNC: 136 MMOL/L (ref 136–145)
SP GR UR STRIP: 1.02
SPECIMEN OUTDATE: NORMAL
UROBILINOGEN UR STRIP-ACNC: ABNORMAL EU/DL
WBC # BLD AUTO: 27.46 K/UL (ref 3.9–12.7)
WBC #/AREA URNS AUTO: 20 /HPF (ref 0–5)
YEAST UR QL AUTO: ABNORMAL /HPF

## 2025-05-10 PROCEDURE — 25000003 PHARM REV CODE 250: Performed by: EMERGENCY MEDICINE

## 2025-05-10 PROCEDURE — 93010 ELECTROCARDIOGRAM REPORT: CPT | Mod: ,,, | Performed by: INTERNAL MEDICINE

## 2025-05-10 PROCEDURE — 85007 BL SMEAR W/DIFF WBC COUNT: CPT | Performed by: EMERGENCY MEDICINE

## 2025-05-10 PROCEDURE — 87389 HIV-1 AG W/HIV-1&-2 AB AG IA: CPT | Performed by: EMERGENCY MEDICINE

## 2025-05-10 PROCEDURE — 86803 HEPATITIS C AB TEST: CPT | Performed by: EMERGENCY MEDICINE

## 2025-05-10 PROCEDURE — 86900 BLOOD TYPING SEROLOGIC ABO: CPT | Performed by: EMERGENCY MEDICINE

## 2025-05-10 PROCEDURE — 63600175 PHARM REV CODE 636 W HCPCS: Performed by: EMERGENCY MEDICINE

## 2025-05-10 PROCEDURE — 87086 URINE CULTURE/COLONY COUNT: CPT | Performed by: EMERGENCY MEDICINE

## 2025-05-10 PROCEDURE — 99285 EMERGENCY DEPT VISIT HI MDM: CPT | Mod: 25

## 2025-05-10 PROCEDURE — 83605 ASSAY OF LACTIC ACID: CPT | Performed by: EMERGENCY MEDICINE

## 2025-05-10 PROCEDURE — 93005 ELECTROCARDIOGRAM TRACING: CPT

## 2025-05-10 PROCEDURE — 80053 COMPREHEN METABOLIC PANEL: CPT | Performed by: EMERGENCY MEDICINE

## 2025-05-10 PROCEDURE — 87040 BLOOD CULTURE FOR BACTERIA: CPT | Performed by: EMERGENCY MEDICINE

## 2025-05-10 PROCEDURE — 96374 THER/PROPH/DIAG INJ IV PUSH: CPT

## 2025-05-10 PROCEDURE — 96361 HYDRATE IV INFUSION ADD-ON: CPT

## 2025-05-10 PROCEDURE — 81001 URINALYSIS AUTO W/SCOPE: CPT | Performed by: EMERGENCY MEDICINE

## 2025-05-10 PROCEDURE — 25500020 PHARM REV CODE 255: Performed by: EMERGENCY MEDICINE

## 2025-05-10 RX ORDER — CEFTRIAXONE 1 G/1
1 INJECTION, POWDER, FOR SOLUTION INTRAMUSCULAR; INTRAVENOUS
Status: COMPLETED | OUTPATIENT
Start: 2025-05-10 | End: 2025-05-10

## 2025-05-10 RX ADMIN — SODIUM CHLORIDE 500 ML: 9 INJECTION, SOLUTION INTRAVENOUS at 10:05

## 2025-05-10 RX ADMIN — IOHEXOL 100 ML: 350 INJECTION, SOLUTION INTRAVENOUS at 10:05

## 2025-05-10 RX ADMIN — CEFTRIAXONE 1 G: 1 INJECTION, POWDER, FOR SOLUTION INTRAMUSCULAR; INTRAVENOUS at 10:05

## 2025-05-10 RX ADMIN — SODIUM CHLORIDE 1000 ML: 9 INJECTION, SOLUTION INTRAVENOUS at 09:05

## 2025-05-11 PROBLEM — Z91.89 AT HIGH RISK FOR ASPIRATION: Status: ACTIVE | Noted: 2025-05-11

## 2025-05-11 PROBLEM — J18.9 MULTIFOCAL PNEUMONIA: Status: ACTIVE | Noted: 2025-05-11

## 2025-05-11 PROBLEM — A41.9 SEPSIS: Status: ACTIVE | Noted: 2025-05-11

## 2025-05-11 PROBLEM — R31.9 HEMATURIA: Status: ACTIVE | Noted: 2025-05-11

## 2025-05-11 PROBLEM — D64.9 ANEMIA: Status: ACTIVE | Noted: 2025-05-11

## 2025-05-11 PROBLEM — N39.0 UTI (URINARY TRACT INFECTION): Status: ACTIVE | Noted: 2025-05-11

## 2025-05-11 PROBLEM — E83.39 HYPOPHOSPHATEMIA: Status: ACTIVE | Noted: 2025-05-11

## 2025-05-11 PROBLEM — R65.21 SEPTIC SHOCK: Status: ACTIVE | Noted: 2025-05-11

## 2025-05-11 PROBLEM — E87.6 HYPOKALEMIA: Status: ACTIVE | Noted: 2025-05-11

## 2025-05-11 LAB
ABSOLUTE NEUTROPHIL MANUAL (OHS): 31 K/UL
ALBUMIN SERPL BCP-MCNC: 2.8 G/DL (ref 3.5–5.2)
ALP SERPL-CCNC: 58 UNIT/L (ref 40–150)
ALT SERPL W/O P-5'-P-CCNC: 43 UNIT/L (ref 10–44)
ANION GAP (OHS): 9 MMOL/L (ref 8–16)
AST SERPL-CCNC: 38 UNIT/L (ref 11–45)
BILIRUB SERPL-MCNC: 0.6 MG/DL (ref 0.1–1)
BUN SERPL-MCNC: 10 MG/DL (ref 6–20)
CALCIUM SERPL-MCNC: 8 MG/DL (ref 8.7–10.5)
CHLORIDE SERPL-SCNC: 109 MMOL/L (ref 95–110)
CO2 SERPL-SCNC: 18 MMOL/L (ref 23–29)
CREAT SERPL-MCNC: 0.6 MG/DL (ref 0.5–1.4)
ERYTHROCYTE [DISTWIDTH] IN BLOOD BY AUTOMATED COUNT: 12.4 % (ref 11.5–14.5)
FERRITIN SERPL-MCNC: 465.4 NG/ML (ref 20–300)
GFR SERPLBLD CREATININE-BSD FMLA CKD-EPI: >60 ML/MIN/1.73/M2
GLUCOSE SERPL-MCNC: 136 MG/DL (ref 70–110)
HCT VFR BLD AUTO: 33.8 % (ref 40–54)
HGB BLD-MCNC: 11.1 GM/DL (ref 14–18)
HOLD SPECIMEN: NORMAL
LACTATE SERPL-SCNC: 1 MMOL/L (ref 0.5–2.2)
LYMPHOCYTES NFR BLD MANUAL: 4 % (ref 18–48)
MAGNESIUM SERPL-MCNC: 1.8 MG/DL (ref 1.6–2.6)
MCH RBC QN AUTO: 29.9 PG (ref 27–31)
MCHC RBC AUTO-ENTMCNC: 32.8 G/DL (ref 32–36)
MCV RBC AUTO: 91 FL (ref 82–98)
MONOCYTES NFR BLD MANUAL: 3 % (ref 4–15)
NEUTROPHILS NFR BLD MANUAL: 93 % (ref 38–73)
NUCLEATED RBC (/100WBC) (OHS): 0 /100 WBC
OHS QRS DURATION: 86 MS
OHS QTC CALCULATION: 466 MS
PHOSPHATE SERPL-MCNC: 1.4 MG/DL (ref 2.7–4.5)
PLATELET # BLD AUTO: 234 K/UL (ref 150–450)
PLATELET BLD QL SMEAR: ABNORMAL
PMV BLD AUTO: 8.8 FL (ref 9.2–12.9)
POTASSIUM SERPL-SCNC: 3.4 MMOL/L (ref 3.5–5.1)
PROT SERPL-MCNC: 6.4 GM/DL (ref 6–8.4)
RBC # BLD AUTO: 3.71 M/UL (ref 4.6–6.2)
SODIUM SERPL-SCNC: 136 MMOL/L (ref 136–145)
WBC # BLD AUTO: 33.35 K/UL (ref 3.9–12.7)

## 2025-05-11 PROCEDURE — 82310 ASSAY OF CALCIUM: CPT | Performed by: STUDENT IN AN ORGANIZED HEALTH CARE EDUCATION/TRAINING PROGRAM

## 2025-05-11 PROCEDURE — 83605 ASSAY OF LACTIC ACID: CPT | Performed by: EMERGENCY MEDICINE

## 2025-05-11 PROCEDURE — 85007 BL SMEAR W/DIFF WBC COUNT: CPT | Performed by: STUDENT IN AN ORGANIZED HEALTH CARE EDUCATION/TRAINING PROGRAM

## 2025-05-11 PROCEDURE — 82728 ASSAY OF FERRITIN: CPT | Performed by: STUDENT IN AN ORGANIZED HEALTH CARE EDUCATION/TRAINING PROGRAM

## 2025-05-11 PROCEDURE — 83735 ASSAY OF MAGNESIUM: CPT | Performed by: STUDENT IN AN ORGANIZED HEALTH CARE EDUCATION/TRAINING PROGRAM

## 2025-05-11 PROCEDURE — 25000003 PHARM REV CODE 250: Performed by: STUDENT IN AN ORGANIZED HEALTH CARE EDUCATION/TRAINING PROGRAM

## 2025-05-11 PROCEDURE — 92610 EVALUATE SWALLOWING FUNCTION: CPT

## 2025-05-11 PROCEDURE — 63600175 PHARM REV CODE 636 W HCPCS: Performed by: STUDENT IN AN ORGANIZED HEALTH CARE EDUCATION/TRAINING PROGRAM

## 2025-05-11 PROCEDURE — 87081 CULTURE SCREEN ONLY: CPT | Performed by: STUDENT IN AN ORGANIZED HEALTH CARE EDUCATION/TRAINING PROGRAM

## 2025-05-11 PROCEDURE — 36415 COLL VENOUS BLD VENIPUNCTURE: CPT | Performed by: EMERGENCY MEDICINE

## 2025-05-11 PROCEDURE — 84100 ASSAY OF PHOSPHORUS: CPT | Performed by: STUDENT IN AN ORGANIZED HEALTH CARE EDUCATION/TRAINING PROGRAM

## 2025-05-11 PROCEDURE — 36415 COLL VENOUS BLD VENIPUNCTURE: CPT | Performed by: STUDENT IN AN ORGANIZED HEALTH CARE EDUCATION/TRAINING PROGRAM

## 2025-05-11 PROCEDURE — 25000003 PHARM REV CODE 250: Performed by: FAMILY MEDICINE

## 2025-05-11 PROCEDURE — 21400001 HC TELEMETRY ROOM

## 2025-05-11 RX ORDER — SODIUM,POTASSIUM PHOSPHATES 280-250MG
2 POWDER IN PACKET (EA) ORAL EVERY 4 HOURS
Status: DISCONTINUED | OUTPATIENT
Start: 2025-05-11 | End: 2025-05-11

## 2025-05-11 RX ORDER — GLUCAGON 1 MG
1 KIT INJECTION
Status: DISCONTINUED | OUTPATIENT
Start: 2025-05-11 | End: 2025-05-13 | Stop reason: HOSPADM

## 2025-05-11 RX ORDER — PROCHLORPERAZINE EDISYLATE 5 MG/ML
2.5 INJECTION INTRAMUSCULAR; INTRAVENOUS EVERY 8 HOURS PRN
Status: DISCONTINUED | OUTPATIENT
Start: 2025-05-11 | End: 2025-05-13 | Stop reason: HOSPADM

## 2025-05-11 RX ORDER — TALC
6 POWDER (GRAM) TOPICAL NIGHTLY PRN
Status: DISCONTINUED | OUTPATIENT
Start: 2025-05-11 | End: 2025-05-13 | Stop reason: HOSPADM

## 2025-05-11 RX ORDER — POLYETHYLENE GLYCOL 3350 17 G/17G
17 POWDER, FOR SOLUTION ORAL 2 TIMES DAILY PRN
Status: DISCONTINUED | OUTPATIENT
Start: 2025-05-11 | End: 2025-05-13 | Stop reason: HOSPADM

## 2025-05-11 RX ORDER — IBUPROFEN 200 MG
16 TABLET ORAL
Status: DISCONTINUED | OUTPATIENT
Start: 2025-05-11 | End: 2025-05-13 | Stop reason: HOSPADM

## 2025-05-11 RX ORDER — ONDANSETRON HYDROCHLORIDE 2 MG/ML
4 INJECTION, SOLUTION INTRAVENOUS EVERY 8 HOURS PRN
Status: DISCONTINUED | OUTPATIENT
Start: 2025-05-11 | End: 2025-05-13 | Stop reason: HOSPADM

## 2025-05-11 RX ORDER — SODIUM CHLORIDE 9 MG/ML
INJECTION, SOLUTION INTRAVENOUS CONTINUOUS
Status: ACTIVE | OUTPATIENT
Start: 2025-05-11 | End: 2025-05-12

## 2025-05-11 RX ORDER — NALOXONE HCL 0.4 MG/ML
0.02 VIAL (ML) INJECTION
Status: DISCONTINUED | OUTPATIENT
Start: 2025-05-11 | End: 2025-05-13 | Stop reason: HOSPADM

## 2025-05-11 RX ORDER — ACETAMINOPHEN 650 MG/1
650 SUPPOSITORY RECTAL EVERY 6 HOURS PRN
Status: DISCONTINUED | OUTPATIENT
Start: 2025-05-11 | End: 2025-05-13 | Stop reason: HOSPADM

## 2025-05-11 RX ORDER — SODIUM CHLORIDE 9 MG/ML
INJECTION, SOLUTION INTRAVENOUS CONTINUOUS
Status: ACTIVE | OUTPATIENT
Start: 2025-05-11 | End: 2025-05-11

## 2025-05-11 RX ORDER — CEFTRIAXONE 2 G/1
2 INJECTION, POWDER, FOR SOLUTION INTRAMUSCULAR; INTRAVENOUS
Status: DISCONTINUED | OUTPATIENT
Start: 2025-05-11 | End: 2025-05-11

## 2025-05-11 RX ORDER — HYDRALAZINE HYDROCHLORIDE 20 MG/ML
5 INJECTION INTRAMUSCULAR; INTRAVENOUS EVERY 6 HOURS PRN
Status: DISCONTINUED | OUTPATIENT
Start: 2025-05-11 | End: 2025-05-13 | Stop reason: HOSPADM

## 2025-05-11 RX ORDER — SODIUM CHLORIDE 0.9 % (FLUSH) 0.9 %
10 SYRINGE (ML) INJECTION EVERY 12 HOURS PRN
Status: DISCONTINUED | OUTPATIENT
Start: 2025-05-11 | End: 2025-05-13 | Stop reason: HOSPADM

## 2025-05-11 RX ORDER — IBUPROFEN 200 MG
24 TABLET ORAL
Status: DISCONTINUED | OUTPATIENT
Start: 2025-05-11 | End: 2025-05-13 | Stop reason: HOSPADM

## 2025-05-11 RX ORDER — BISACODYL 10 MG/1
10 SUPPOSITORY RECTAL DAILY
Status: DISCONTINUED | OUTPATIENT
Start: 2025-05-11 | End: 2025-05-13 | Stop reason: HOSPADM

## 2025-05-11 RX ORDER — ACETAMINOPHEN 325 MG/1
650 TABLET ORAL EVERY 6 HOURS PRN
Status: DISCONTINUED | OUTPATIENT
Start: 2025-05-11 | End: 2025-05-13 | Stop reason: HOSPADM

## 2025-05-11 RX ORDER — CEFTRIAXONE 1 G/1
1 INJECTION, POWDER, FOR SOLUTION INTRAMUSCULAR; INTRAVENOUS ONCE
Status: DISCONTINUED | OUTPATIENT
Start: 2025-05-11 | End: 2025-05-11

## 2025-05-11 RX ADMIN — POTASSIUM PHOSPHATE, MONOBASIC POTASSIUM PHOSPHATE, DIBASIC INJECTION, 15 MMOL: 236; 224 SOLUTION, CONCENTRATE INTRAVENOUS at 07:05

## 2025-05-11 RX ADMIN — PIPERACILLIN AND TAZOBACTAM 4.5 G: 4; .5 INJECTION, POWDER, LYOPHILIZED, FOR SOLUTION INTRAVENOUS; PARENTERAL at 04:05

## 2025-05-11 RX ADMIN — PIPERACILLIN AND TAZOBACTAM 4.5 G: 4; .5 INJECTION, POWDER, LYOPHILIZED, FOR SOLUTION INTRAVENOUS; PARENTERAL at 12:05

## 2025-05-11 RX ADMIN — BISACODYL 10 MG: 10 SUPPOSITORY RECTAL at 09:05

## 2025-05-11 RX ADMIN — ACETAMINOPHEN 650 MG: 325 TABLET ORAL at 04:05

## 2025-05-11 RX ADMIN — SODIUM CHLORIDE: 9 INJECTION, SOLUTION INTRAVENOUS at 09:05

## 2025-05-11 RX ADMIN — SODIUM CHLORIDE: 9 INJECTION, SOLUTION INTRAVENOUS at 08:05

## 2025-05-11 RX ADMIN — ACETAMINOPHEN 650 MG: 650 SUPPOSITORY RECTAL at 08:05

## 2025-05-11 RX ADMIN — SODIUM CHLORIDE 1000 ML: 9 INJECTION, SOLUTION INTRAVENOUS at 07:05

## 2025-05-11 RX ADMIN — PIPERACILLIN AND TAZOBACTAM 4.5 G: 4; .5 INJECTION, POWDER, LYOPHILIZED, FOR SOLUTION INTRAVENOUS; PARENTERAL at 08:05

## 2025-05-11 RX ADMIN — VANCOMYCIN HYDROCHLORIDE 670 MG: 1 INJECTION, POWDER, LYOPHILIZED, FOR SOLUTION INTRAVENOUS at 06:05

## 2025-05-11 NOTE — SUBJECTIVE & OBJECTIVE
Past Medical History:   Diagnosis Date    Muscular dystrophy        History reviewed. No pertinent surgical history.    Review of patient's allergies indicates:  No Known Allergies    No current facility-administered medications on file prior to encounter.     Current Outpatient Medications on File Prior to Encounter   Medication Sig    bisacodyl (DULCOLAX) 10 mg Supp Place 1 suppository (10 mg total) rectally daily as needed. (Patient not taking: Reported on 5/10/2025)    docusate sodium (COLACE) 100 MG capsule Take 1 capsule (100 mg total) by mouth 2 (two) times daily. (Patient not taking: Reported on 5/10/2025)    metoclopramide HCl (REGLAN) 5 MG tablet Take 5 mg by mouth 4 (four) times daily. (Patient not taking: Reported on 5/10/2025)    polyethylene glycol (GLYCOLAX) 17 gram/dose powder Take 17 g by mouth once daily. Use daily to reduce constipation - keep bowels moving (Patient not taking: Reported on 5/10/2025)     Family History       Problem Relation (Age of Onset)    Aneurysm Father    Hypertension Mother          Tobacco Use    Smoking status: Never    Smokeless tobacco: Not on file   Substance and Sexual Activity    Alcohol use: No    Drug use: No    Sexual activity: Never     Review of Systems   Unable to perform ROS: Patient nonverbal     Objective:     Vital Signs (Most Recent):  Temp: (!) 102.1 °F (38.9 °C) (05/11/25 0437)  Pulse: (!) 113 (05/11/25 0507)  Resp: 18 (05/11/25 0437)  BP: 95/62 (05/11/25 0437)  SpO2: 95 % (05/11/25 0437) Vital Signs (24h Range):  Temp:  [98 °F (36.7 °C)-102.1 °F (38.9 °C)] 102.1 °F (38.9 °C)  Pulse:  [108-123] 113  Resp:  [16-20] 18  SpO2:  [93 %-96 %] 95 %  BP: ()/(40-83) 95/62     Weight: 33.5 kg (73 lb 13.7 oz)  Body mass index is 14.42 kg/m².     Physical Exam  Vitals and nursing note reviewed.   Constitutional:       General: He is not in acute distress.     Appearance: He is ill-appearing. He is not toxic-appearing or diaphoretic.   HENT:      Head:  Atraumatic.   Eyes:      General: No scleral icterus.        Right eye: No discharge.         Left eye: No discharge.   Cardiovascular:      Rate and Rhythm: Regular rhythm. Tachycardia present.      Heart sounds: Normal heart sounds.   Pulmonary:      Effort: Pulmonary effort is normal. No respiratory distress.      Breath sounds: No wheezing or rales.   Abdominal:      General: Bowel sounds are normal. There is no distension.      Palpations: Abdomen is soft.      Tenderness: There is no guarding or rebound.   Musculoskeletal:         General: Deformity (Contractures of upper and lower extremities) present.      Right lower leg: No edema.      Left lower leg: No edema.   Skin:     General: Skin is warm and dry.      Coloration: Skin is not jaundiced.   Neurological:      Mental Status: He is alert. Mental status is at baseline.                Significant Labs: All pertinent labs within the past 24 hours have been reviewed.  Recent Results (from the past 24 hours)   Hepatitis C Antibody    Collection Time: 05/10/25  9:20 PM   Result Value Ref Range    Hep C Ab Interp Negative Negative   HIV 1/2 Ag/Ab (4th Gen)    Collection Time: 05/10/25  9:20 PM   Result Value Ref Range    HIV 1/2 Ag/Ab Negative Negative   Comprehensive metabolic panel    Collection Time: 05/10/25  9:20 PM   Result Value Ref Range    Sodium 136 136 - 145 mmol/L    Potassium 3.7 3.5 - 5.1 mmol/L    Chloride 109 95 - 110 mmol/L    CO2 19 (L) 23 - 29 mmol/L    Glucose 255 (H) 70 - 110 mg/dL    BUN 21 (H) 6 - 20 mg/dL    Creatinine 0.7 0.5 - 1.4 mg/dL    Calcium 8.2 (L) 8.7 - 10.5 mg/dL    Protein Total 7.0 6.0 - 8.4 gm/dL    Albumin 3.1 (L) 3.5 - 5.2 g/dL    Bilirubin Total 0.4 0.1 - 1.0 mg/dL    ALP 57 40 - 150 unit/L    AST 14 11 - 45 unit/L    ALT 14 10 - 44 unit/L    Anion Gap 8 8 - 16 mmol/L    eGFR >60 >60 mL/min/1.73/m2   Lactic acid, plasma #1    Collection Time: 05/10/25  9:20 PM   Result Value Ref Range    Lactic Acid Level 2.2 0.5 - 2.2  mmol/L   CBC with Differential    Collection Time: 05/10/25  9:20 PM   Result Value Ref Range    WBC 27.46 (H) 3.90 - 12.70 K/uL    RBC 4.12 (L) 4.60 - 6.20 M/uL    HGB 12.4 (L) 14.0 - 18.0 gm/dL    HCT 37.5 (L) 40.0 - 54.0 %    MCV 91 82 - 98 fL    MCH 30.1 27.0 - 31.0 pg    MCHC 33.1 32.0 - 36.0 g/dL    RDW 12.3 11.5 - 14.5 %    Platelet Count 270 150 - 450 K/uL    MPV 8.9 (L) 9.2 - 12.9 fL    Nucleated RBC 0 <=0 /100 WBC   Type & Screen    Collection Time: 05/10/25  9:20 PM   Result Value Ref Range    Specimen Outdate 05/13/2025 23:59     Group & Rh B POS     Indirect Osmani NEG    Lavender Top Hold    Collection Time: 05/10/25  9:20 PM   Result Value Ref Range    Extra Tube Hold for add-ons.    Manual Differential    Collection Time: 05/10/25  9:20 PM   Result Value Ref Range    Gran # (ANC) 26.1 K/uL    Segmented Neutrophil % 94.0 (H) 38.0 - 73.0 %    Bands % 1.0 %    Lymphocyte % 1.0 (L) 18.0 - 48.0 %    Monocyte % 4.0 4.0 - 15.0 %    Platelet Estimate Appears Normal     ABORH RETYPE    Collection Time: 05/10/25  9:25 PM   Result Value Ref Range    ABORH Retype B POS    Urinalysis, Reflex to Urine Culture Urine, Clean Catch    Collection Time: 05/10/25  9:26 PM    Specimen: Urine   Result Value Ref Range    Color, UA Yellow Straw, Katya, Yellow, Light-Orange    Appearance, UA Clear Clear    pH, UA 7.0 5.0 - 8.0    Spec Grav UA 1.020 1.005 - 1.030    Protein, UA Trace (A) Negative    Glucose, UA 3+ (A) Negative    Ketones, UA Trace (A) Negative    Bilirubin, UA Negative Negative    Blood, UA 2+ (A) Negative    Nitrites, UA Negative Negative    Urobilinogen, UA 2.0-3.0 (A) <2.0 EU/dL    Leukocyte Esterase, UA Negative Negative   GREY TOP URINE HOLD    Collection Time: 05/10/25  9:26 PM   Result Value Ref Range    Extra Tube Hold for add-ons.    Urinalysis Microscopic    Collection Time: 05/10/25  9:26 PM   Result Value Ref Range    RBC, UA >100 (H) 0 - 4 /HPF    WBC, UA 20 (H) 0 - 5 /HPF    Bacteria, UA None  None, Rare, Occasional /HPF    Yeast, UA None None /HPF    Microscopic Comment     EKG 12-lead    Collection Time: 05/10/25  9:37 PM   Result Value Ref Range    QRS Duration 86 ms    [facility] QTC Calculation 466 ms   Light Green Top Hold    Collection Time: 05/10/25 11:25 PM   Result Value Ref Range    Extra Tube Hold for add-ons.    Lactic acid, plasma #2    Collection Time: 05/11/25  1:31 AM   Result Value Ref Range    Lactic Acid Level 1.0 0.5 - 2.2 mmol/L   Comprehensive Metabolic Panel    Collection Time: 05/11/25  4:44 AM   Result Value Ref Range    Sodium 136 136 - 145 mmol/L    Potassium 3.4 (L) 3.5 - 5.1 mmol/L    Chloride 109 95 - 110 mmol/L    CO2 18 (L) 23 - 29 mmol/L    Glucose 136 (H) 70 - 110 mg/dL    BUN 10 6 - 20 mg/dL    Creatinine 0.6 0.5 - 1.4 mg/dL    Calcium 8.0 (L) 8.7 - 10.5 mg/dL    Protein Total 6.4 6.0 - 8.4 gm/dL    Albumin 2.8 (L) 3.5 - 5.2 g/dL    Bilirubin Total 0.6 0.1 - 1.0 mg/dL    ALP 58 40 - 150 unit/L    AST 38 11 - 45 unit/L    ALT 43 10 - 44 unit/L    Anion Gap 9 8 - 16 mmol/L    eGFR >60 >60 mL/min/1.73/m2   Magnesium    Collection Time: 05/11/25  4:44 AM   Result Value Ref Range    Magnesium  1.8 1.6 - 2.6 mg/dL   Phosphorus    Collection Time: 05/11/25  4:44 AM   Result Value Ref Range    Phosphorus Level 1.4 (L) 2.7 - 4.5 mg/dL   CBC with Differential    Collection Time: 05/11/25  4:44 AM   Result Value Ref Range    WBC 33.35 (H) 3.90 - 12.70 K/uL    RBC 3.71 (L) 4.60 - 6.20 M/uL    HGB 11.1 (L) 14.0 - 18.0 gm/dL    HCT 33.8 (L) 40.0 - 54.0 %    MCV 91 82 - 98 fL    MCH 29.9 27.0 - 31.0 pg    MCHC 32.8 32.0 - 36.0 g/dL    RDW 12.4 11.5 - 14.5 %    Platelet Count 234 150 - 450 K/uL    MPV 8.8 (L) 9.2 - 12.9 fL    Nucleated RBC 0 <=0 /100 WBC   Manual Differential    Collection Time: 05/11/25  4:44 AM   Result Value Ref Range    Gran # (ANC) 31.0 K/uL    Segmented Neutrophil % 93.0 (H) 38.0 - 73.0 %    Lymphocyte % 4.0 (L) 18.0 - 48.0 %    Monocyte % 3.0 (L) 4.0 - 15.0 %     Platelet Estimate Appears Normal         Significant Imaging: I have reviewed all pertinent imaging results/findings within the past 24 hours.  X-Ray Chest AP Portable   Final Result    No acute process.      Finalized on: 5/10/2025 10:11 PM By:  Beto Winters MD   [facility]# [id number]      2025-05-10 22:13:12.031     [facility]      CT Chest Abdomen Pelvis With IV Contrast (XPD) NO Oral Contrast    (Results Pending)

## 2025-05-11 NOTE — ASSESSMENT & PLAN NOTE
"-59 y.o. male with muscular dystrophy who presented to ED for evaluation after after family noted concerns for hematuria.   -When EMS arrived, patient was noted to be hypotensive requiring Levophed to stabilize.  Brother reports that 3-4 days ago, patient was noted to have a fever which resolved after ibuprofen administration.    -ED course notable for vitals with tachycardia, hypotension.  Hospital course also notable for recurrent fevers   -Initial workup included: Labs notable for WBC 27.46, Urinalysis notable for WBC 20, RBC >100, blood 2+, ketones trace, glucose 3+, protein trace.  Chest x-ray nonacute.  CT C/A/P with IV contrast was completed with preliminary findings noted of  "patchy pulmonary opacities noted right lung compatible with multifocal right-sided pneumonia.  Aspiration not excluded in the correct clinical setting...."Urinary bladder wall thickening compatible with cystitis""      This patient does have evidence of infective focus  My overall impression is septic shock due to SBP < 90.  Required Levophed administration during EMS course.    Source: Urinary Tract versus Respiratory tract    Latest lactate reviewed-  Recent Labs   Lab 05/10/25  2120 05/11/25  0131   LACTATE 2.2 1.0       PLAN  - Follow up final read of CT imaging  - Follow-up blood cultures, urine cultures and deescalate antibiotics as appropriate  - Telemetry. Strict I/Os   -Source control achieved by:  Antibiotics  Antibiotics (From admission, onward)      Start     Stop Route Frequency Ordered    05/11/25 0600  vancomycin (VANCOCIN) 670 mg in D5W 134 mL IV syringe (Conc: 5 mg/ml)         -- IV Once 05/11/25 0459    05/11/25 0345  piperacillin-tazobactam (ZOSYN) 4.5 g in D5W 100 mL IVPB (MB+)         -- IV Every 8 hours (non-standard times) 05/11/25 0230    05/11/25 0335  vancomycin - pharmacy to dose  (vancomycin IVPB (PEDS and ADULTS))        Placed in "And" Linked Group    -- IV pharmacy to manage frequency 05/11/25 0235      "       Microbiology Results (last 7 days)       Procedure Component Value Units Date/Time    Culture, MRSA [[id number]] Collected: 05/11/25 0441    Order Status: Sent Specimen: MRSA source from Nares, Left Updated: 05/11/25 0505    Urine culture [[id number]] Collected: 05/10/25 2126    Order Status: Sent Specimen: Urine Updated: 05/10/25 2145    Blood culture x two cultures. Draw prior to antibiotics. [[id number]] Collected: 05/10/25 2119    Order Status: Resulted Specimen: Blood from Peripheral, Forearm, Left Updated: 05/10/25 2132    Blood culture x two cultures. Draw prior to antibiotics. [[id number]] Collected: 05/10/25 2121    Order Status: Resulted Specimen: Blood from Peripheral, Forearm, Right Updated: 05/10/25 2132

## 2025-05-11 NOTE — CONSULTS
"Pharmacokinetic Initial Assessment: IV Vancomycin    Assessment/Plan:    Initiate intravenous vancomycin with loading dose of 670 mg once followed by a maintenance dose of vancomycin 500mg IV every 24 hours  Desired empiric serum trough concentration is 10 to 15 mcg/mL  Draw vancomycin trough level 60 min prior to third dose on 5/13 at approximately 050  Pharmacy will continue to follow and monitor vancomycin.      Please contact pharmacy at extension [phone] with any questions regarding this assessment.     Thank you for the consult,   Lita Long, PharmD 5/11/2025 7:21 AM       Patient brief summary:  Godwin Jeter is a 59 y.o. male initiated on antimicrobial therapy with IV Vancomycin for treatment of suspected sepsis due to UTI    Drug Allergies:   Review of patient's allergies indicates:  No Known Allergies    Actual Body Weight:   33.5kg    Renal Function:   Estimated Creatinine Clearance: 63.6 mL/min (based on SCr of 0.6 mg/dL).,     Dialysis Method (if applicable):  N/A    CBC (last 72 hours):  Recent Labs   Lab Result Units 05/10/25  2120 05/11/25  0444   WBC K/uL 27.46* 33.35*   HGB gm/dL 12.4* 11.1*   HCT % 37.5* 33.8*   Platelet Count K/uL 270 234   Lymphocyte % % 1.0* 4.0*   Monocyte % % 4.0 3.0*       Metabolic Panel (last 72 hours):  Recent Labs   Lab Result Units 05/10/25  2120 05/10/25  2126 05/11/25  0444   Sodium mmol/L 136  --  136   Potassium mmol/L 3.7  --  3.4*   Chloride mmol/L 109  --  109   CO2 mmol/L 19*  --  18*   Glucose mg/dL 255*  --  136*   Glucose, UA   --  3+*  --    BUN mg/dL 21*  --  10   Creatinine mg/dL 0.7  --  0.6   Albumin g/dL 3.1*  --  2.8*   Bilirubin Total mg/dL 0.4  --  0.6   ALP unit/L 57  --  58   AST unit/L 14  --  38   ALT unit/L 14  --  43   Magnesium  mg/dL  --   --  1.8   Phosphorus Level mg/dL  --   --  1.4*       Drug levels (last 3 results):  No results for input(s): "VANCOMYCINRA", "VANCORANDOM", "VANCOMYCINPE", "VANCOPEAK", "VANCOMYCINTR", "VANCOTROUGH" in " the last 72 hours.    Microbiologic Results:  Microbiology Results (last 7 days)       Procedure Component Value Units Date/Time    Culture, MRSA [[id number]] Collected: 05/11/25 0441    Order Status: Sent Specimen: MRSA source from Nares, Left Updated: 05/11/25 0505    Urine culture [[id number]] Collected: 05/10/25 2126    Order Status: Sent Specimen: Urine Updated: 05/10/25 2145    Blood culture x two cultures. Draw prior to antibiotics. [[id number]] Collected: 05/10/25 2119    Order Status: Resulted Specimen: Blood from Peripheral, Forearm, Left Updated: 05/10/25 2132    Blood culture x two cultures. Draw prior to antibiotics. [[id number]] Collected: 05/10/25 2121    Order Status: Resulted Specimen: Blood from Peripheral, Forearm, Right Updated: 05/10/25 2132

## 2025-05-11 NOTE — NURSING
MD to bedside to speak with pt and his family about goals of care. At this time pt and family desire for him to eat, risks explained. Decided pt would be able to eat pureed food.   Aye wahl to speak with family further about what hospice can offer to pt and family.   MD to round back once pts mother arrives       BP improving, BP (!) 88/49   Pulse 102   Temp 100 °F (37.8 °C)   Resp 18   Ht 5' (1.524 m)   Wt 33.5 kg (73 lb 13.7 oz)   SpO2 (!) 93%   BMI 14.42 kg/m²

## 2025-05-11 NOTE — PLAN OF CARE
A208/A208 RAEANN Jeter is a 59 y.o.male admitted on 5/10/2025 for Septic shock   Code Status: Full Code MRN: [id number]   Review of patient's allergies indicates:  No Known Allergies  Past Medical History:   Diagnosis Date    Muscular dystrophy       PRN meds    acetaminophen, 650 mg, Q6H PRN  dextrose 50%, 12.5 g, PRN  dextrose 50%, 25 g, PRN  glucagon (human recombinant), 1 mg, PRN  glucose, 16 g, PRN  glucose, 24 g, PRN  hydrALAZINE, 5 mg, Q6H PRN  melatonin, 6 mg, Nightly PRN  naloxone, 0.02 mg, PRN  ondansetron, 4 mg, Q8H PRN  polyethylene glycol, 17 g, BID PRN  prochlorperazine, 2.5 mg, Q8H PRN  sodium chloride 0.9%, 10 mL, Q12H PRN      Chart check completed. Will continue plan of care.     goals of care talk, hospice to come out and speak with family- family provided options by SW. Zosyn for UTI temp max 100.0     Orientation: disoriented to, time, place  Naylor Coma Scale Score: 13     Lead Monitored: Lead II Rhythm: normal sinus rhythm    Cardiac/Telemetry Box Number: [id number]    Last Bowel Movement: 05/11/25  Diet Pureed (IDDSI Level 4)  Voiding Characteristics: incontinence, external catheter  Samuel Score: 9  Fall Risk Score: 9  Accucheck []   Freq?      Lines/Drains/Airways       Drain  Duration             Male External Urinary Catheter 05/10/25 2248 <1 day              Peripheral Intravenous Line  Duration                  Peripheral IV - Single Lumen 05/10/25 2102 20 G Anterior;Left Forearm <1 day         Peripheral IV - Single Lumen 05/10/25 2118 20 G Distal;Posterior;Right Forearm <1 day

## 2025-05-11 NOTE — PLAN OF CARE
S.T./Swallowing Evaluation completed. Pt.is a high risk for aspiration and is recommended to remain NPO at this time. Education completed with pt's brother in room.  S.T. to continue to follow and assess.

## 2025-05-11 NOTE — ASSESSMENT & PLAN NOTE
"-59 y.o. male with muscular dystrophy who presented to ED for evaluation after after family noted concerns for hematuria.   -When EMS arrived, patient was noted to be hypotensive requiring Levophed to stabilize.  Brother reports that 3-4 days ago, patient was noted to have a fever which resolved after ibuprofen administration.    -ED course notable for vitals with tachycardia, hypotension.  Hospital course also notable for recurrent fevers   -Initial workup included: Labs notable for WBC 27.46, Urinalysis notable for WBC 20, RBC >100, blood 2+, ketones trace, glucose 3+, protein trace.  Chest x-ray nonacute.  CT C/A/P with IV contrast was completed with preliminary findings noted of  "patchy pulmonary opacities noted right lung compatible with multifocal right-sided pneumonia.  Aspiration not excluded in the correct clinical setting...."Urinary bladder wall thickening compatible with cystitis""      This patient does have evidence of infective focus  My overall impression is septic shock due to SBP < 90.  Required Levophed administration during EMS course.    Source: Urinary Tract versus Respiratory tract    Latest lactate reviewed-  Recent Labs   Lab 05/10/25  2120 05/11/25  0131   LACTATE 2.2 1.0       PLAN  - Follow up final read of CT imaging  - Follow-up blood cultures, urine cultures and deescalate antibiotics as appropriate  - Telemetry. Strict I/Os   -Source control achieved by:  Antibiotics  Antibiotics (From admission, onward)      Start     Stop Route Frequency Ordered    05/11/25 0600  vancomycin (VANCOCIN) 670 mg in D5W 134 mL IV syringe (Conc: 5 mg/ml)         -- IV Once 05/11/25 0459    05/11/25 0345  piperacillin-tazobactam (ZOSYN) 4.5 g in D5W 100 mL IVPB (MB+)         -- IV Every 8 hours (non-standard times) 05/11/25 0230    05/11/25 0335  vancomycin - pharmacy to dose  (vancomycin IVPB (PEDS and ADULTS))        Placed in "And" Linked Group    -- IV pharmacy to manage frequency 05/11/25 0235      "       Microbiology Results (last 7 days)       Procedure Component Value Units Date/Time    Culture, MRSA [[id number]] Collected: 05/11/25 0441    Order Status: Sent Specimen: MRSA source from Nares, Left Updated: 05/11/25 0505    Urine culture [[id number]] Collected: 05/10/25 2126    Order Status: Sent Specimen: Urine Updated: 05/10/25 2145    Blood culture x two cultures. Draw prior to antibiotics. [[id number]] Collected: 05/10/25 2119    Order Status: Resulted Specimen: Blood from Peripheral, Forearm, Left Updated: 05/10/25 2132    Blood culture x two cultures. Draw prior to antibiotics. [[id number]] Collected: 05/10/25 2121    Order Status: Resulted Specimen: Blood from Peripheral, Forearm, Right Updated: 05/10/25 2132

## 2025-05-11 NOTE — ED PROVIDER NOTES
SCRIBE #1 NOTE: I, Sal Lu, am scribing for, and in the presence of, Leroy Carlson MD. I have scribed the entire note.       History     Chief Complaint   Patient presents with    Hematuria     Pt presents to the ED with c/o hematuria that onset earlier today. Per EMS, pt had fever a few days ago which resolved. When they arrived at the pt's home, they noted there was gross hematuria and that the urine was concentrated. Pt was found to be hypotensive and was initiated on Levophed to stabilize BP. On arrival BP is 99/54 (73) with Levo infusing at 4mcg/min per EMS. PMHx muscular dystrophy.      Review of patient's allergies indicates:  No Known Allergies      History of Present Illness     HPI    5/10/2025, 9:43 PM  History obtained from the medical records    HPI limited since pt is nonverbal on exam      History of Present Illness: Godwin Jeter is a 59 y.o. male patient with a PMHx of muscular dystrophy who presents to the Emergency Department for evaluation of hematuria which onset earlier today. Nursing staff reports pt was found with hematuria and hypotensive at his home. Given Levo infusing at 4 mcg/min by EMS en route. No further complaints or concerns at this time.       Arrival mode: Ambulance Service    PCP: No, Primary Doctor        Past Medical History:  Past Medical History:   Diagnosis Date    Muscular dystrophy        Past Surgical History:  History reviewed. No pertinent surgical history.      Family History:  Family History   Problem Relation Name Age of Onset    Hypertension Mother      Aneurysm Father         Social History:  Social History     Tobacco Use    Smoking status: Never    Smokeless tobacco: Not on file   Substance and Sexual Activity    Alcohol use: No    Drug use: No    Sexual activity: Never        Review of Systems     Review of Systems   Unable to perform ROS: Patient nonverbal   Genitourinary:  Positive for hematuria.        Physical Exam     Initial Vitals   BP Pulse  Resp Temp SpO2   05/10/25 2105 05/10/25 2105 05/10/25 2105 05/10/25 2116 05/10/25 2105   (!) 88/40 (!) 117 16 98 °F (36.7 °C) 96 %      MAP       --                 Physical Exam  Nursing Notes and Vital Signs Reviewed.  PEx limited due to pt's condition  Constitutional: Patient is in no acute distress. Muscular dystrophy.  Head: Atraumatic.  Eyes: EOM intact. Conjunctivae are not pale. No scleral icterus.  ENT: Mucous membranes are moist.   Neck: Supple. Full ROM.  Cardiovascular: Tachycardic. Regular rhythm. No murmurs, rubs, or gallops.  Pulmonary/Chest: No respiratory distress. Clear to auscultation bilaterally. No wheezing or rales.  Abdominal: Soft and non-distended.  There is no tenderness.  No rebound, guarding, or rigidity.  Musculoskeletal: Muscular dystrophy.  Skin: Warm and dry.  Neurological:  No acute focal neurological deficits are appreciated.       ED Course   Critical Care    Date/Time: 5/10/2025 11:52 PM    Performed by: Leroy Carlson MD  Authorized by: Leroy Carlson MD  Direct patient critical care time: 15 minutes  Additional history critical care time: 10 minutes  Ordering / reviewing critical care time: 5 minutes  Documentation critical care time: 5 minutes  Consulting other physicians critical care time: 5 minutes  Total critical care time (exclusive of procedural time) : 40 minutes  Critical care time was exclusive of separately billable procedures and treating other patients and teaching time.  Critical care was necessary to treat or prevent imminent or life-threatening deterioration of the following conditions: sepsis.  Critical care was time spent personally by me on the following activities: blood draw for specimens, development of treatment plan with patient or surrogate, discussions with consultants, interpretation of cardiac output measurements, evaluation of patient's response to treatment, examination of patient, obtaining history from patient or surrogate, ordering and  performing treatments and interventions, ordering and review of laboratory studies, ordering and review of radiographic studies, pulse oximetry, re-evaluation of patient's condition and review of old charts.        ED Vital Signs:  Vitals:    05/10/25 2207 05/10/25 2212 05/10/25 2217 05/10/25 2222   BP: (!) 95/53 105/64 100/61 106/70   Pulse: (!) 113 (!) 114 (!) 113 (!) 113   Resp: 17 18 17 17   Temp:       TempSrc:       SpO2: (!) 94% 95% (!) 94% (!) 94%   Weight:       Height:        05/10/25 2237 05/10/25 2246 05/10/25 2307 05/10/25 2312   BP: (!) 107/59 125/74 135/81 127/71   Pulse: (!) 114 (!) 120 (!) 123 (!) 119   Resp: 18 19  19   Temp:  98.1 °F (36.7 °C)     TempSrc:  Oral     SpO2: (!) 94% 95% 95% (!) 94%   Weight:       Height:        05/10/25 2317 05/10/25 2322 05/10/25 2327 05/10/25 2332   BP: 122/70 117/69 116/72 113/70   Pulse: (!) 120 (!) 117 (!) 117 (!) 117   Resp: 19 18 18 18   Temp:       TempSrc:       SpO2: (!) 94% (!) 94% (!) 94% (!) 94%   Weight:       Height:        05/10/25 2337 05/10/25 2342 05/10/25 2346   BP: 134/71 (!) 113/59 118/64   Pulse: (!) 118 (!) 117 (!) 118   Resp: 19 18 17   Temp:      TempSrc:      SpO2: (!) 94% (!) 94% (!) 94%   Weight:      Height:          Abnormal Lab Results:  Labs Reviewed   COMPREHENSIVE METABOLIC PANEL - Abnormal       Result Value    Sodium 136      Potassium 3.7      Chloride 109      CO2 19 (*)     Glucose 255 (*)     BUN 21 (*)     Creatinine 0.7      Calcium 8.2 (*)     Protein Total 7.0      Albumin 3.1 (*)     Bilirubin Total 0.4      ALP 57      AST 14      ALT 14      Anion Gap 8      eGFR >60     URINALYSIS, REFLEX TO URINE CULTURE - Abnormal    Color, UA Yellow      Appearance, UA Clear      pH, UA 7.0      Spec Grav UA 1.020      Protein, UA Trace (*)     Glucose, UA 3+ (*)     Ketones, UA Trace (*)     Bilirubin, UA Negative      Blood, UA 2+ (*)     Nitrites, UA Negative      Urobilinogen, UA 2.0-3.0 (*)     Leukocyte Esterase, UA Negative      CBC WITH DIFFERENTIAL - Abnormal    WBC 27.46 (*)     RBC 4.12 (*)     HGB 12.4 (*)     HCT 37.5 (*)     MCV 91      MCH 30.1      MCHC 33.1      RDW 12.3      Platelet Count 270      MPV 8.9 (*)     Nucleated RBC 0     URINALYSIS MICROSCOPIC - Abnormal    RBC, UA >100 (*)     WBC, UA 20 (*)     Bacteria, UA None      Yeast, UA None      Microscopic Comment       MANUAL DIFFERENTIAL - Abnormal    Gran # (ANC) 26.1      Segmented Neutrophil % 94.0 (*)     Bands % 1.0      Lymphocyte % 1.0 (*)     Monocyte % 4.0      Platelet Estimate Appears Normal     HEPATITIS C ANTIBODY - Normal    Hep C Ab Interp Negative     HIV 1 / 2 ANTIBODY - Normal    HIV 1/2 Ag/Ab Negative     LACTIC ACID, PLASMA - Normal    Lactic Acid Level 2.2      Narrative:     Falsely low lactic acid results can be found in samples containing >=13.0 mg/dL total bilirubin and/or >=3.5 mg/dL direct bilirubin.    CULTURE, BLOOD   CULTURE, BLOOD   CULTURE, URINE   CBC W/ AUTO DIFFERENTIAL    Narrative:     The following orders were created for panel order CBC auto differential.  Procedure                               Abnormality         Status                     ---------                               -----------         ------                     CBC with Differential[[id number]]       Abnormal            Final result               Manual Differential[[id number]]         Abnormal            Final result                 Please view results for these tests on the individual orders.   GREY TOP URINE HOLD    Extra Tube Hold for add-ons.     EXTRA TUBES    Narrative:     The following orders were created for panel order EXTRA TUBES.  Procedure                               Abnormality         Status                     ---------                               -----------         ------                     Lavender Top Hold[[id number]]                               Final result                 Please view results for these tests on the individual orders.    LAVENDER TOP HOLD    Extra Tube Hold for add-ons.     HEP C VIRUS HOLD SPECIMEN   EXTRA TUBES    Narrative:     The following orders were created for panel order EXTRA TUBES.  Procedure                               Abnormality         Status                     ---------                               -----------         ------                     Light Green Top Hold[[id number]]                            In process                   Please view results for these tests on the individual orders.   LIGHT GREEN TOP HOLD   TYPE & SCREEN    Specimen Outdate 05/13/2025 23:59      Group & Rh B POS      Indirect Osmani NEG     ABORH RETYPE    ABORH Retype B POS          All Lab Results:  Results for orders placed or performed during the hospital encounter of 05/10/25   Hepatitis C Antibody    Collection Time: 05/10/25  9:20 PM   Result Value Ref Range    Hep C Ab Interp Negative Negative   HIV 1/2 Ag/Ab (4th Gen)    Collection Time: 05/10/25  9:20 PM   Result Value Ref Range    HIV 1/2 Ag/Ab Negative Negative   Comprehensive metabolic panel    Collection Time: 05/10/25  9:20 PM   Result Value Ref Range    Sodium 136 136 - 145 mmol/L    Potassium 3.7 3.5 - 5.1 mmol/L    Chloride 109 95 - 110 mmol/L    CO2 19 (L) 23 - 29 mmol/L    Glucose 255 (H) 70 - 110 mg/dL    BUN 21 (H) 6 - 20 mg/dL    Creatinine 0.7 0.5 - 1.4 mg/dL    Calcium 8.2 (L) 8.7 - 10.5 mg/dL    Protein Total 7.0 6.0 - 8.4 gm/dL    Albumin 3.1 (L) 3.5 - 5.2 g/dL    Bilirubin Total 0.4 0.1 - 1.0 mg/dL    ALP 57 40 - 150 unit/L    AST 14 11 - 45 unit/L    ALT 14 10 - 44 unit/L    Anion Gap 8 8 - 16 mmol/L    eGFR >60 >60 mL/min/1.73/m2   Lactic acid, plasma #1    Collection Time: 05/10/25  9:20 PM   Result Value Ref Range    Lactic Acid Level 2.2 0.5 - 2.2 mmol/L   CBC with Differential    Collection Time: 05/10/25  9:20 PM   Result Value Ref Range    WBC 27.46 (H) 3.90 - 12.70 K/uL    RBC 4.12 (L) 4.60 - 6.20 M/uL    HGB 12.4 (L) 14.0 - 18.0 gm/dL    HCT 37.5  (L) 40.0 - 54.0 %    MCV 91 82 - 98 fL    MCH 30.1 27.0 - 31.0 pg    MCHC 33.1 32.0 - 36.0 g/dL    RDW 12.3 11.5 - 14.5 %    Platelet Count 270 150 - 450 K/uL    MPV 8.9 (L) 9.2 - 12.9 fL    Nucleated RBC 0 <=0 /100 WBC   Lavender Top Hold    Collection Time: 05/10/25  9:20 PM   Result Value Ref Range    Extra Tube Hold for add-ons.    Manual Differential    Collection Time: 05/10/25  9:20 PM   Result Value Ref Range    Gran # (ANC) 26.1 K/uL    Segmented Neutrophil % 94.0 (H) 38.0 - 73.0 %    Bands % 1.0 %    Lymphocyte % 1.0 (L) 18.0 - 48.0 %    Monocyte % 4.0 4.0 - 15.0 %    Platelet Estimate Appears Normal     Type & Screen    Collection Time: 05/10/25  9:20 PM   Result Value Ref Range    Specimen Outdate 05/13/2025 23:59     Group & Rh B POS     Indirect Osmani NEG    ABORH RETYPE    Collection Time: 05/10/25  9:25 PM   Result Value Ref Range    ABORH Retype B POS    Urinalysis, Reflex to Urine Culture Urine, Clean Catch    Collection Time: 05/10/25  9:26 PM    Specimen: Urine   Result Value Ref Range    Color, UA Yellow Straw, Katya, Yellow, Light-Orange    Appearance, UA Clear Clear    pH, UA 7.0 5.0 - 8.0    Spec Grav UA 1.020 1.005 - 1.030    Protein, UA Trace (A) Negative    Glucose, UA 3+ (A) Negative    Ketones, UA Trace (A) Negative    Bilirubin, UA Negative Negative    Blood, UA 2+ (A) Negative    Nitrites, UA Negative Negative    Urobilinogen, UA 2.0-3.0 (A) <2.0 EU/dL    Leukocyte Esterase, UA Negative Negative   GREY TOP URINE HOLD    Collection Time: 05/10/25  9:26 PM   Result Value Ref Range    Extra Tube Hold for add-ons.    Urinalysis Microscopic    Collection Time: 05/10/25  9:26 PM   Result Value Ref Range    RBC, UA >100 (H) 0 - 4 /HPF    WBC, UA 20 (H) 0 - 5 /HPF    Bacteria, UA None None, Rare, Occasional /HPF    Yeast, UA None None /HPF    Microscopic Comment         Imaging Results:  Imaging Results              CT Chest Abdomen Pelvis With IV Contrast (XPD) NO Oral Contrast (In process)                       X-Ray Chest AP Portable (Final result)  Result time 05/10/25 22:11:09      Final result by Beto Winters MD (05/10/25 22:11:09)                   Impression:     No acute process.    Finalized on: 5/10/2025 10:11 PM By:  Beto Winters MD  [facility]# [id number]      2025-05-10 22:13:12.031     [facility]               Narrative:    EXAM:  XR CHEST AP PORTABLE    CLINICAL INDICATION: Sepsis.    COMPARISON STUDY:  None.    FINDINGS: The patient is rotated.  Normal size heart.  Lungs appear clear.  The bones appear osteopenic.  There is mild rightward curvature upper thoracic spine.  Air-filled stomach and/or bowel loop left upper abdomen.                                    Type of Interpretation: Outside Written Report  STAT Radiology Procedure Done:  CT Chest w/ IV contrast  Interpretation:  Patchy pulmonary opacities noted right lung compatible with multifocal right-sided pneumonia. Aspiration not excluded in the correct clinical setting.  Radiologist:  Howard Rubalcava MD  05/10/2025  23:33    Type of Interpretation: Outside Written Report  STAT Radiology Procedure Done:  CT Abd and Pelvis w/ IV contrast  Interpretation:  1. Urinary bladder wall thickening compatible with cystitis. Layering hyperdensity in the urinary bladder may reflect early urographic phase contrast from test injection or layering calcific structures such as bladder calculi. 2. Moderate amount of fecal material may reflect impaction in the correct clinical setting.  Radiologist:  Howard Rubalcava MD  05/10/2025  23:33                    The EKG was ordered, reviewed, and independently interpreted by the ED provider.  Interpretation time: 21:37  Rate: 112 BPM  Rhythm: sinus tachycardia  Interpretation: No STEMI.             The Emergency Provider reviewed the vital signs and test results, which are outlined above.     ED Discussion     10:32 PM: A focused exam (Vital Signs Reviewed, Cardiopulmonary Exam, Capillary Refill Evaluation, Peripheral  Pulse Evaluation and Skin Examination) performed.      12:01 AM: Discussed case with Arianna Hong NP ([facility]). Dr. Boyd agrees with current care and management of pt and accepts admission.   Admitting Service: [facility]  Admitting Physician: Dr. Boyd  Admit to: inpt tele    12:01 AM: Re-evaluated pt. I have discussed test results, shared treatment plan, and the need for admission with patient/family/caretaker at bedside. Pt and/or family/caretaker express understanding at this time and agree with all information. All questions answered. Pt/caretaker/family member(s) have no further questions or concerns at this time. Pt is ready for admit.       Medical Decision Making  59 year male with a past medical history of muscular dystrophy here with sepsis.  His presenting complaint was hematuria.  White blood cell count 27.  Multifocal right-sided pneumonia on CT.  Possible UTI as well. initial BP was 80 systolic.  Blood pressure has improved with 30 ml/kg of IV fluids.  Lactic 2.2.  Rocephin and azithromycin given.  Patient admitted to Medicine normotensive with repeat lactic pending    Amount and/or Complexity of Data Reviewed  Independent Historian:      Details: HPI per triage and nursing staff since pt is nonverbal on exam  External Data Reviewed: notes.     Details: Past medical history, medications, allergies reviewed  Labs: ordered. Decision-making details documented in ED Course.  Radiology: ordered and independent interpretation performed. Decision-making details documented in ED Course.  ECG/medicine tests: ordered and independent interpretation performed. Decision-making details documented in ED Course.    Risk  Prescription drug management.  Decision regarding hospitalization.    Critical Care  Total time providing critical care: 40 minutes                ED Medication(s):  Medications   azithromycin (ZITHROMAX) 500 mg in 0.9% NaCl 250 mL IVPB (admixture device) (has no  administration in time range)   sodium chloride 0.9% bolus 1,000 mL 1,000 mL (0 mLs Intravenous Stopped 5/10/25 2145)   sodium chloride 0.9% bolus 500 mL 500 mL (0 mLs Intravenous Stopped 5/10/25 2246)   cefTRIAXone injection 1 g (1 g Intravenous Given 5/10/25 2208)   iohexoL (OMNIPAQUE 350) injection 100 mL (100 mLs Intravenous Given 5/10/25 2248)       New Prescriptions    No medications on file               Scribe Attestation:   Scribe #1: I performed the above scribed service and the documentation accurately describes the services I performed. I attest to the accuracy of the note.     Attending:   Physician Attestation Statement for Scribe #1: I, Leroy Carlson MD, personally performed the services described in this documentation, as scribed by Sal Lu, in my presence, and it is both accurate and complete.           Clinical Impression       ICD-10-CM ICD-9-CM   1. Pneumonia of right lung due to infectious organism, unspecified part of lung  J18.9 483.8   2. Screening for cardiovascular condition  Z13.6 V81.2   3. Sepsis  A41.9 038.9     995.91   4. Chest pain  R07.9 786.50       Disposition:   Disposition: Admitted  Condition: Leroy Willams MD  05/11/25 8410

## 2025-05-11 NOTE — ACP (ADVANCE CARE PLANNING)
[facility]  I engaged the family in a voluntary conversation about advance care planning and we specifically addressed what the goals of care would be moving forward, in light of the patient's change in clinical status, specifically muscular dystrophy, aspiration, gross hematuria.  We did specifically address the patient's likely prognosis, which is poor.  We explored the patient's values and preferences for future care.  The family endorses that what is most important right now is to focus on symptom/pain control    Accordingly, we have decided that the best plan to meet the patient's goals includes continuing with treatment    A total of 21 min was spent on advance care planning, goals of care discussion, emotional support, formulating and communicating prognosis and exploring burden/benefit of various approaches of treatment. This discussion occurred on a fully voluntary basis with the verbal consent of the patient and/or family.   Brother and sister would like to speak to mother about hospice referral

## 2025-05-11 NOTE — ASSESSMENT & PLAN NOTE
"-59 y.o. male with muscular dystrophy who presented to ED for evaluation after after family noted concerns for hematuria.   -When EMS arrived, patient was noted to be hypotensive requiring Levophed to stabilize.  Brother reports that 3-4 days ago, patient was noted to have a fever which resolved after ibuprofen administration.    -ED course notable for vitals with tachycardia, hypotension.  Hospital course also notable for recurrent fevers   -Initial workup included: Labs notable for WBC 27.46, Urinalysis notable for WBC 20, RBC >100, blood 2+, ketones trace, glucose 3+, protein trace.  Chest x-ray nonacute.  CT C/A/P with IV contrast was completed with preliminary findings noted of  "patchy pulmonary opacities noted right lung compatible with multifocal right-sided pneumonia.  Aspiration not excluded in the correct clinical setting...."Urinary bladder wall thickening compatible with cystitis""      This patient does have evidence of infective focus  My overall impression is septic shock due to SBP < 90.  Required Levophed administration during EMS course.    Source: Urinary Tract versus Respiratory tract    Latest lactate reviewed-  Recent Labs   Lab 05/10/25  2120 05/11/25  0131   LACTATE 2.2 1.0       PLAN  - Follow up final read of CT imaging  - Follow-up blood cultures, urine cultures and deescalate antibiotics as appropriate  - Telemetry. Strict I/Os   -Source control achieved by:  Antibiotics  Antibiotics (From admission, onward)      Start     Stop Route Frequency Ordered    05/11/25 0600  vancomycin (VANCOCIN) 670 mg in D5W 134 mL IV syringe (Conc: 5 mg/ml)         -- IV Once 05/11/25 0459    05/11/25 0345  piperacillin-tazobactam (ZOSYN) 4.5 g in D5W 100 mL IVPB (MB+)         -- IV Every 8 hours (non-standard times) 05/11/25 0230    05/11/25 0335  vancomycin - pharmacy to dose  (vancomycin IVPB (PEDS and ADULTS))        Placed in "And" Linked Group    -- IV pharmacy to manage frequency 05/11/25 0235      "       Microbiology Results (last 7 days)       Procedure Component Value Units Date/Time    Culture, MRSA [[id number]] Collected: 05/11/25 0441    Order Status: Sent Specimen: MRSA source from Nares, Left Updated: 05/11/25 0505    Urine culture [[id number]] Collected: 05/10/25 2126    Order Status: Sent Specimen: Urine Updated: 05/10/25 2145    Blood culture x two cultures. Draw prior to antibiotics. [[id number]] Collected: 05/10/25 2119    Order Status: Resulted Specimen: Blood from Peripheral, Forearm, Left Updated: 05/10/25 2132    Blood culture x two cultures. Draw prior to antibiotics. [[id number]] Collected: 05/10/25 2121    Order Status: Resulted Specimen: Blood from Peripheral, Forearm, Right Updated: 05/10/25 2132

## 2025-05-11 NOTE — H&P
[facility]  History & Physical    Patient Name: Godwin Jeter  MRN: [id number]  Patient Class: IP- Inpatient  Admission Date: 5/10/2025  Attending Physician: Emmanuel Boyd DO   Primary Care Provider: Kristy, Primary Doctor         Patient information was obtained from relative(s), past medical records, and ER records.     Subjective:     Principal Problem:Septic shock    Chief Complaint:   Chief Complaint   Patient presents with    Hematuria     Pt presents to the ED with c/o hematuria that onset earlier today. Per EMS, pt had fever a few days ago which resolved. When they arrived at the pt's home, they noted there was gross hematuria and that the urine was concentrated. Pt was found to be hypotensive and was initiated on Levophed to stabilize BP. On arrival BP is 99/54 (73) with Levo infusing at 4mcg/min per EMS. PMHx muscular dystrophy.         HPI: Patient information was obtained from relative(youngest brother), past medical records, and ER records.      Mr. Godwin Jeter is a 59 y.o. male who  has a past medical history of Muscular dystrophy.    He  presented to ED for evaluation after after family noted concerns for hematuria.  When EMS arrived, patient was noted to be hypotensive requiring Levophed to stabilize.  Brother reports that 3-4 days ago, patient was noted to have a fever which resolved after ibuprofen administration.  Brother denies any recent changes in patient other than hematuria.  Denies any upper or lower respiratory symptoms, or abdominal symptoms. At baseline, patient is bed-bound, fully dependent and requires assistance for his ADL, usually eats a pureed diet, uses a condom catheter, has very limited verbal ability to communicate.  He does not take any home medications.  Per brother, no recent concerns for aspiration or dysphagia, patient is at baseline mentation and is able to tolerate a pureed-like food diet  at home which typically consists of rice and  "soft foods.     ED course notable for vitals with tachycardia, hypotension.  Labs notable for WBC 27.46, hemoglobin 12.4, bicarb 19, glucose 225, calcium 8.2, albumin 3.1.  Urinalysis notable for WBC 20, RBC >100, blood 2+, ketones trace, glucose 3+, protein trace.  Chest x-ray nonacute.  CT C/A/P with IV contrast was completed with preliminary findings noted of  "patchy pulmonary opacities noted right lung compatible with multifocal right-sided pneumonia.  Aspiration not excluded in the correct clinical setting...."Urinary bladder wall thickening compatible with cystitis""      Past Medical History:   Diagnosis Date    Muscular dystrophy        History reviewed. No pertinent surgical history.    Review of patient's allergies indicates:  No Known Allergies    No current facility-administered medications on file prior to encounter.     Current Outpatient Medications on File Prior to Encounter   Medication Sig    bisacodyl (DULCOLAX) 10 mg Supp Place 1 suppository (10 mg total) rectally daily as needed. (Patient not taking: Reported on 5/10/2025)    docusate sodium (COLACE) 100 MG capsule Take 1 capsule (100 mg total) by mouth 2 (two) times daily. (Patient not taking: Reported on 5/10/2025)    metoclopramide HCl (REGLAN) 5 MG tablet Take 5 mg by mouth 4 (four) times daily. (Patient not taking: Reported on 5/10/2025)    polyethylene glycol (GLYCOLAX) 17 gram/dose powder Take 17 g by mouth once daily. Use daily to reduce constipation - keep bowels moving (Patient not taking: Reported on 5/10/2025)     Family History       Problem Relation (Age of Onset)    Aneurysm Father    Hypertension Mother          Tobacco Use    Smoking status: Never    Smokeless tobacco: Not on file   Substance and Sexual Activity    Alcohol use: No    Drug use: No    Sexual activity: Never     Review of Systems   Unable to perform ROS: Patient nonverbal     Objective:     Vital Signs (Most Recent):  Temp: (!) 102.1 °F (38.9 °C) (05/11/25 " 0437)  Pulse: (!) 113 (05/11/25 0507)  Resp: 18 (05/11/25 0437)  BP: 95/62 (05/11/25 0437)  SpO2: 95 % (05/11/25 0437) Vital Signs (24h Range):  Temp:  [98 °F (36.7 °C)-102.1 °F (38.9 °C)] 102.1 °F (38.9 °C)  Pulse:  [108-123] 113  Resp:  [16-20] 18  SpO2:  [93 %-96 %] 95 %  BP: ()/(40-83) 95/62     Weight: 33.5 kg (73 lb 13.7 oz)  Body mass index is 14.42 kg/m².     Physical Exam  Vitals and nursing note reviewed.   Constitutional:       General: He is not in acute distress.     Appearance: He is ill-appearing. He is not toxic-appearing or diaphoretic.   HENT:      Head: Atraumatic.   Eyes:      General: No scleral icterus.        Right eye: No discharge.         Left eye: No discharge.   Cardiovascular:      Rate and Rhythm: Regular rhythm. Tachycardia present.      Heart sounds: Normal heart sounds.   Pulmonary:      Effort: Pulmonary effort is normal. No respiratory distress.      Breath sounds: No wheezing or rales.   Abdominal:      General: Bowel sounds are normal. There is no distension.      Palpations: Abdomen is soft.      Tenderness: There is no guarding or rebound.   Musculoskeletal:         General: Deformity (Contractures of upper and lower extremities) present.      Right lower leg: No edema.      Left lower leg: No edema.   Skin:     General: Skin is warm and dry.      Coloration: Skin is not jaundiced.   Neurological:      Mental Status: He is alert. Mental status is at baseline.                Significant Labs: All pertinent labs within the past 24 hours have been reviewed.  Recent Results (from the past 24 hours)   Hepatitis C Antibody    Collection Time: 05/10/25  9:20 PM   Result Value Ref Range    Hep C Ab Interp Negative Negative   HIV 1/2 Ag/Ab (4th Gen)    Collection Time: 05/10/25  9:20 PM   Result Value Ref Range    HIV 1/2 Ag/Ab Negative Negative   Comprehensive metabolic panel    Collection Time: 05/10/25  9:20 PM   Result Value Ref Range    Sodium 136 136 - 145 mmol/L     Potassium 3.7 3.5 - 5.1 mmol/L    Chloride 109 95 - 110 mmol/L    CO2 19 (L) 23 - 29 mmol/L    Glucose 255 (H) 70 - 110 mg/dL    BUN 21 (H) 6 - 20 mg/dL    Creatinine 0.7 0.5 - 1.4 mg/dL    Calcium 8.2 (L) 8.7 - 10.5 mg/dL    Protein Total 7.0 6.0 - 8.4 gm/dL    Albumin 3.1 (L) 3.5 - 5.2 g/dL    Bilirubin Total 0.4 0.1 - 1.0 mg/dL    ALP 57 40 - 150 unit/L    AST 14 11 - 45 unit/L    ALT 14 10 - 44 unit/L    Anion Gap 8 8 - 16 mmol/L    eGFR >60 >60 mL/min/1.73/m2   Lactic acid, plasma #1    Collection Time: 05/10/25  9:20 PM   Result Value Ref Range    Lactic Acid Level 2.2 0.5 - 2.2 mmol/L   CBC with Differential    Collection Time: 05/10/25  9:20 PM   Result Value Ref Range    WBC 27.46 (H) 3.90 - 12.70 K/uL    RBC 4.12 (L) 4.60 - 6.20 M/uL    HGB 12.4 (L) 14.0 - 18.0 gm/dL    HCT 37.5 (L) 40.0 - 54.0 %    MCV 91 82 - 98 fL    MCH 30.1 27.0 - 31.0 pg    MCHC 33.1 32.0 - 36.0 g/dL    RDW 12.3 11.5 - 14.5 %    Platelet Count 270 150 - 450 K/uL    MPV 8.9 (L) 9.2 - 12.9 fL    Nucleated RBC 0 <=0 /100 WBC   Type & Screen    Collection Time: 05/10/25  9:20 PM   Result Value Ref Range    Specimen Outdate 05/13/2025 23:59     Group & Rh B POS     Indirect Osmani NEG    Lavender Top Hold    Collection Time: 05/10/25  9:20 PM   Result Value Ref Range    Extra Tube Hold for add-ons.    Manual Differential    Collection Time: 05/10/25  9:20 PM   Result Value Ref Range    Gran # (ANC) 26.1 K/uL    Segmented Neutrophil % 94.0 (H) 38.0 - 73.0 %    Bands % 1.0 %    Lymphocyte % 1.0 (L) 18.0 - 48.0 %    Monocyte % 4.0 4.0 - 15.0 %    Platelet Estimate Appears Normal     ABORH RETYPE    Collection Time: 05/10/25  9:25 PM   Result Value Ref Range    ABORH Retype B POS    Urinalysis, Reflex to Urine Culture Urine, Clean Catch    Collection Time: 05/10/25  9:26 PM    Specimen: Urine   Result Value Ref Range    Color, UA Yellow Straw, Katya, Yellow, Light-Orange    Appearance, UA Clear Clear    pH, UA 7.0 5.0 - 8.0    Spec Grav UA  1.020 1.005 - 1.030    Protein, UA Trace (A) Negative    Glucose, UA 3+ (A) Negative    Ketones, UA Trace (A) Negative    Bilirubin, UA Negative Negative    Blood, UA 2+ (A) Negative    Nitrites, UA Negative Negative    Urobilinogen, UA 2.0-3.0 (A) <2.0 EU/dL    Leukocyte Esterase, UA Negative Negative   GREY TOP URINE HOLD    Collection Time: 05/10/25  9:26 PM   Result Value Ref Range    Extra Tube Hold for add-ons.    Urinalysis Microscopic    Collection Time: 05/10/25  9:26 PM   Result Value Ref Range    RBC, UA >100 (H) 0 - 4 /HPF    WBC, UA 20 (H) 0 - 5 /HPF    Bacteria, UA None None, Rare, Occasional /HPF    Yeast, UA None None /HPF    Microscopic Comment     EKG 12-lead    Collection Time: 05/10/25  9:37 PM   Result Value Ref Range    QRS Duration 86 ms    OHS QTC Calculation 466 ms   Light Green Top Hold    Collection Time: 05/10/25 11:25 PM   Result Value Ref Range    Extra Tube Hold for add-ons.    Lactic acid, plasma #2    Collection Time: 05/11/25  1:31 AM   Result Value Ref Range    Lactic Acid Level 1.0 0.5 - 2.2 mmol/L   Comprehensive Metabolic Panel    Collection Time: 05/11/25  4:44 AM   Result Value Ref Range    Sodium 136 136 - 145 mmol/L    Potassium 3.4 (L) 3.5 - 5.1 mmol/L    Chloride 109 95 - 110 mmol/L    CO2 18 (L) 23 - 29 mmol/L    Glucose 136 (H) 70 - 110 mg/dL    BUN 10 6 - 20 mg/dL    Creatinine 0.6 0.5 - 1.4 mg/dL    Calcium 8.0 (L) 8.7 - 10.5 mg/dL    Protein Total 6.4 6.0 - 8.4 gm/dL    Albumin 2.8 (L) 3.5 - 5.2 g/dL    Bilirubin Total 0.6 0.1 - 1.0 mg/dL    ALP 58 40 - 150 unit/L    AST 38 11 - 45 unit/L    ALT 43 10 - 44 unit/L    Anion Gap 9 8 - 16 mmol/L    eGFR >60 >60 mL/min/1.73/m2   Magnesium    Collection Time: 05/11/25  4:44 AM   Result Value Ref Range    Magnesium  1.8 1.6 - 2.6 mg/dL   Phosphorus    Collection Time: 05/11/25  4:44 AM   Result Value Ref Range    Phosphorus Level 1.4 (L) 2.7 - 4.5 mg/dL   CBC with Differential    Collection Time: 05/11/25  4:44 AM   Result  "Value Ref Range    WBC 33.35 (H) 3.90 - 12.70 K/uL    RBC 3.71 (L) 4.60 - 6.20 M/uL    HGB 11.1 (L) 14.0 - 18.0 gm/dL    HCT 33.8 (L) 40.0 - 54.0 %    MCV 91 82 - 98 fL    MCH 29.9 27.0 - 31.0 pg    MCHC 32.8 32.0 - 36.0 g/dL    RDW 12.4 11.5 - 14.5 %    Platelet Count 234 150 - 450 K/uL    MPV 8.8 (L) 9.2 - 12.9 fL    Nucleated RBC 0 <=0 /100 WBC   Manual Differential    Collection Time: 05/11/25  4:44 AM   Result Value Ref Range    Gran # (ANC) 31.0 K/uL    Segmented Neutrophil % 93.0 (H) 38.0 - 73.0 %    Lymphocyte % 4.0 (L) 18.0 - 48.0 %    Monocyte % 3.0 (L) 4.0 - 15.0 %    Platelet Estimate Appears Normal         Significant Imaging: I have reviewed all pertinent imaging results/findings within the past 24 hours.  X-Ray Chest AP Portable   Final Result    No acute process.      Finalized on: 5/10/2025 10:11 PM By:  Beto Winters MD   [facility]# [id number]      2025-05-10 22:13:12.031     [facility]      CT Chest Abdomen Pelvis With IV Contrast (XPD) NO Oral Contrast    (Results Pending)       Assessment/Plan:     Assessment & Plan  Septic shock  UTI (urinary tract infection)  Multifocal pneumonia  -59 y.o. male with muscular dystrophy who presented to ED for evaluation after after family noted concerns for hematuria.   -When EMS arrived, patient was noted to be hypotensive requiring Levophed to stabilize.  Brother reports that 3-4 days ago, patient was noted to have a fever which resolved after ibuprofen administration.    -ED course notable for vitals with tachycardia, hypotension.  Hospital course also notable for recurrent fevers   -Initial workup included: Labs notable for WBC 27.46, Urinalysis notable for WBC 20, RBC >100, blood 2+, ketones trace, glucose 3+, protein trace.  Chest x-ray nonacute.  CT C/A/P with IV contrast was completed with preliminary findings noted of  "patchy pulmonary opacities noted right lung compatible with multifocal right-sided pneumonia.  Aspiration not excluded in the correct clinical " "setting...."Urinary bladder wall thickening compatible with cystitis""      This patient does have evidence of infective focus  My overall impression is septic shock due to SBP < 90.  Required Levophed administration during EMS course.    Source: Urinary Tract versus Respiratory tract    Latest lactate reviewed-  Recent Labs   Lab 05/10/25  2120 05/11/25  0131   LACTATE 2.2 1.0       PLAN  - Follow up final read of CT imaging  - Follow-up blood cultures, urine cultures and deescalate antibiotics as appropriate  - Telemetry. Strict I/Os   -Source control achieved by:  Antibiotics  Antibiotics (From admission, onward)      Start     Stop Route Frequency Ordered    05/11/25 0600  vancomycin (VANCOCIN) 670 mg in D5W 134 mL IV syringe (Conc: 5 mg/ml)         -- IV Once 05/11/25 0459    05/11/25 0345  piperacillin-tazobactam (ZOSYN) 4.5 g in D5W 100 mL IVPB (MB+)         -- IV Every 8 hours (non-standard times) 05/11/25 0230    05/11/25 0335  vancomycin - pharmacy to dose  (vancomycin IVPB (PEDS and ADULTS))        Placed in "And" Linked Group    -- IV pharmacy to manage frequency 05/11/25 0235            Microbiology Results (last 7 days)       Procedure Component Value Units Date/Time    Culture, MRSA [[id number]] Collected: 05/11/25 0441    Order Status: Sent Specimen: MRSA source from Nares, Left Updated: 05/11/25 0505    Urine culture [[id number]] Collected: 05/10/25 2126    Order Status: Sent Specimen: Urine Updated: 05/10/25 2145    Blood culture x two cultures. Draw prior to antibiotics. [[id number]] Collected: 05/10/25 2119    Order Status: Resulted Specimen: Blood from Peripheral, Forearm, Left Updated: 05/10/25 2132    Blood culture x two cultures. Draw prior to antibiotics. [[id number]] Collected: 05/10/25 2121    Order Status: Resulted Specimen: Blood from Peripheral, Forearm, Right Updated: 05/10/25 2132          Hematuria  Likely secondary to UTI.  Resolution noted on presentation with clear appearing urine " in collection.    PLAN:  Continue to monitor.  If concerns for gross hematuria, consider urology consult    Normocytic anemia  -Patient's with Normocytic anemia..   -Hemoglobin downtrending.   -Etiology likely due to possibly nutritional etiology versus recent hematuria concerns.  -Current CBC reviewed-    Recent Labs   Lab 05/10/25  2120 05/11/25  0444   HGB 12.4* 11.1*         PLAN  - Monitor serial CBC and transfuse if patient becomes hemodynamically unstable, symptomatic or H/H drops below 7/21.  -Follow up iron studies      Hypophosphatemia  Patient's most recent phosphorus results are listed below.   Recent Labs     05/11/25 0444   PHOS 1.4*     Plan  - Will treat hypophosphatemia with repletion as necessitated  Hypokalemia  Patient's most recent potassium results are listed below.   Recent Labs     05/10/25  2120 05/11/25  0444   K 3.7 3.4*     Plan  - Replete potassium per protocol  - Monitor potassium Daily    VTE Risk Mitigation (From admission, onward)           Ordered     IP VTE LOW RISK PATIENT  Once         05/11/25 0037     Place sequential compression device  Until discontinued         05/11/25 0037                              Emmanuel Boyd DO  Department of Hospital Medicine  [facility] - Telemetry ([facility])      Voice recognition software was used in the creation of this note/communication and any sound-alike/typographical errors which may have occurred despite initial review prior to signing should be taken in context when interpreting.  If such errors prevent a clear understanding of the note/communication, please contact the provider/office for clarification.

## 2025-05-11 NOTE — PT/OT/SLP EVAL
Speech Language Pathology Evaluation  Bedside Swallow    Patient Name:  Godwin Jeter   MRN:  [id number]  Admitting Diagnosis: Septic shock    Recommendations:                 General Recommendations:  Dysphagia therapy  Diet recommendations:  NPO, NPO   Aspiration Precautions: Frequent oral care and Strict aspiration precautions   General Precautions: Aspiration precautions   Communication strategies:  yes/no questions only, provide increased time to answer, and go to room if call light pushed    Assessment:     Godwin Jeter is a 59 y.o. male with an SLP diagnosis of Dysphagia.  He presents with high risks of aspiration. Pt's brother in room who communicated for patient who speaks primarily [place].  Brother reported that pt eats a pureed to very soft diet and thin liquids and is fed in bed with head at about 30 degree angle.  Brother reports that he has been tolerating this diet consistency with usually a good appetite.  Pt does have a history of Muscular Dystrophy and brother is his primary caretaker.    Pt was given several small sips of water via straw and 2 small bites of applesauce.  Pt with delayed swallows, coughing with thin liquids and throat clearing.  Pt also presented with a wet vocal quality following oral intake.  Pt with reduced oral motor skills.  He did follow simple commands.       Due to risks of aspiration at this time pt is recommended to remain NPO and S.T. to reassess with further goals.   Education completed with the patient and his brother of recommendations and goals.      History:   Pt is a 59 year old male admitted with hematuria and hypotension. S.T. requested to assess swallowing skills.    Past Medical History:   Diagnosis Date    Muscular dystrophy        History reviewed. No pertinent surgical history.    Social History: Patient lives with his brother who is his primary caretaker.    Prior Intubation HX:  none reported     Modified Barium Swallow: none reported     Chest X-Rays: see  medical chart     Prior diet: pureed-soft consistency and and thin liquids    Occupation/hobbies/homemaking: n/a.    Subjective     Pt currently NPO   Patient goals: brother would like him to eat when he can      Pain/Comfort:  Pain Rating 1: 0/10    Respiratory Status: see medica chart    Objective:     Oral Musculature Evaluation  Oral Musculature: general weakness  Mucosal Quality: adequate    Bedside Swallow Eval:   Consistencies Assessed:  Thin liquids pt uses a straw per  brother's report. Pt in reclined position due to inability to tolerate more upright position.  Pt given small amount via straw; pt took a few minutes to use straw effectively. He did present with delayed swallow, throat clearing, 1 cough and wet vocal quality following  Puree Pt with delayed swallow and throat clearing following      Oral Phase:   Decreased closure around utensil  Slow oral transit time    Pharyngeal Phase:   delayed swallow initation  throat clearing  wet vocal quality after swallow    Compensatory Strategies  Multiple swallows    Treatment:  see assessment above    Goals:   Multidisciplinary Problems       SLP Goals          Problem: SLP    Goal Priority Disciplines Outcome   SLP Goal     SLP    Description: 1. Pt will tolerate highest level diet consistency safely and efficiently.                       Plan:     Patient to be seen:  4 x/week, 5 x/week   Plan of Care expires:  05/18/25  Plan of Care reviewed with:  patient, sibling   SLP Follow-Up:  Yes       Discharge recommendations:    determine at d/c  Barriers to Discharge:  Level of Skilled Assistance Needed   and Safety Awareness      Time Tracking:     SLP Treatment Date:   05/11/25  Speech Start Time:  1035  Speech Stop Time:  1105     Speech Total Time (min):  30 min    Billable Minutes: Eval Swallow and Oral Function 30  minutes     05/11/2025

## 2025-05-11 NOTE — ASSESSMENT & PLAN NOTE
Likely secondary to UTI.  Resolution noted on presentation with clear appearing urine in collection.    PLAN:  Continue to monitor.  If concerns for gross hematuria, consider urology consult

## 2025-05-11 NOTE — PLAN OF CARE
Problem: Adult Inpatient Plan of Care  Goal: Plan of Care Review  Outcome: Progressing  Goal: Patient-Specific Goal (Individualized)  Outcome: Progressing  Goal: Absence of Hospital-Acquired Illness or Injury  Outcome: Progressing  Goal: Optimal Comfort and Wellbeing  Outcome: Progressing  Goal: Readiness for Transition of Care  Outcome: Progressing     Problem: Fall Injury Risk  Goal: Absence of Fall and Fall-Related Injury  Outcome: Progressing     Problem: Skin Injury Risk Increased  Goal: Skin Health and Integrity  Outcome: Progressing     Problem: Wound  Goal: Optimal Coping  Outcome: Progressing  Goal: Optimal Functional Ability  Outcome: Progressing  Goal: Absence of Infection Signs and Symptoms  Outcome: Progressing  Goal: Improved Oral Intake  Outcome: Progressing  Goal: Optimal Pain Control and Function  Outcome: Progressing  Goal: Skin Health and Integrity  Outcome: Progressing  Goal: Optimal Wound Healing  Outcome: Progressing     Problem: Sepsis/Septic Shock  Goal: Optimal Coping  Outcome: Progressing  Goal: Absence of Bleeding  Outcome: Progressing  Goal: Blood Glucose Level Within Targeted Range  Outcome: Progressing  Goal: Absence of Infection Signs and Symptoms  Outcome: Progressing  Goal: Optimal Nutrition Intake  Outcome: Progressing     Problem: Pneumonia  Goal: Fluid Balance  Outcome: Progressing  Goal: Resolution of Infection Signs and Symptoms  Outcome: Progressing  Goal: Effective Oxygenation and Ventilation  Outcome: Progressing

## 2025-05-11 NOTE — NURSING
Bp BP (!) 70/41 (BP Location: Left arm, Patient Position: Lying)   Pulse 101   Temp 97.8 °F (36.6 °C) (Tympanic)   Resp 20   Ht 5' (1.524 m)   Wt 33.5 kg (73 lb 13.7 oz)   SpO2 97%   BMI 14.42 kg/m²   Md notified   Bolus ordered   Then NS at 100      At 912 Bolus complete repeat BP   Blood pressure (!) 72/45, pulse 97, temperature 98.5 °F (36.9 °C), resp. rate 18, height 5' (1.524 m), weight 33.5 kg (73 lb 13.7 oz), SpO2 (!) 93%.   Md notified  Plan for goals of care with family

## 2025-05-11 NOTE — PLAN OF CARE
[facility] - Telemetry (Hospital)  Initial Discharge Assessment       Primary Care Provider: No, Primary Doctor    Admission Diagnosis: Screening for cardiovascular condition [Z13.6]  Chest pain [R07.9]  Sepsis [A41.9]  Pneumonia of right lung due to infectious organism, unspecified part of lung [J18.9]    Admission Date: 5/10/2025  Expected Discharge Date:     Transition of Care Barriers: None    Payor: [employer] [facility] / Plan: [employer] / Product Type: Medicare Advantage /     Extended Emergency Contact Information  Primary Emergency Contact: Leticia Jeter   [place]  Mobile Phone: [phone]  Relation:     Discharge Plan A: Home         [facility] [place] [address]  [place] - [address] [facility]  [address] [address]  Phone: [phone] Fax: [phone]      Initial Assessment (most recent)       Adult Discharge Assessment - 05/11/25 1053          Discharge Assessment    Assessment Type Discharge Planning Assessment     Confirmed/corrected address, phone number and insurance Yes     Confirmed Demographics Correct on Facesheet     Source of Information family     Does patient/caregiver understand observation status Yes     Communicated AMOL with patient/caregiver Date not available/Unable to determine     People in Home parent(s);sibling(s)     Do you expect to return to your current living situation? Yes     Do you have help at home or someone to help you manage your care at home? Yes     Who are your caregiver(s) and their phone number(s)? First name basis, sister Lundberg, [phone]     Prior to hospitilization cognitive status: Alert/Oriented     Current cognitive status: Alert/Oriented     Walking or Climbing Stairs Difficulty no     Dressing/Bathing Difficulty no     Readmission within 30 days? No     Patient currently being followed by outpatient case management? No     Do you currently have service(s) that help you manage  your care at home? No     Do you take prescription medications? Yes     Do you have prescription coverage? Yes     Do you have any problems affording any of your prescribed medications? No     Is the patient taking medications as prescribed? yes     Who is going to help you get home at discharge? [facility]     How do you get to doctors appointments? public transportation     Are you on dialysis? No     Do you take coumadin? No     Discharge Plan A Home     DME Needed Upon Discharge  none     Discharge Plan discussed with: Sibling     Name(s) and Number(s) sister Lundberg, [phone]     Transition of Care Barriers None

## 2025-05-11 NOTE — PLAN OF CARE
05/11/25 1708   Post-Acute Status   Post-Acute Authorization Hospice  (I met at bedside with patient & family due to consult for hospice arrangement.)   Hospice Status Discharge Plan Changed  (Pending patient / family consent to send referrals as they are unsure if they want hospice or something else instead.  Left info for patient & family to review & offered to set up a family meeting if they wanted to question the hospices about services)   Discharge Delays (!) Patient and Family Barriers  (Questions were asked about surviving hospice, changing your mind if hospice is initiated, & if hospital decision could be delayed to after discharge.  Family might still be considering more aggressive treatment options focused on cure.)   Discharge Plan   Discharge Plan A Hospice/home  (Family interested in patient discharging home with future need for inpatient care as he declines. Info given on [facility],  [facility], & [facility] as they all have options for both home & inpatient hospice.)   Discharge Plan B Home Health  (If not ready for hospice, I notified attending Dr Morris that patient could be discharged with home health while they discuss future needs.  Some hospice companies, such as [facility], [facility], [facility], have HH too to make transition to hospice easier.)

## 2025-05-11 NOTE — HPI
"Patient information was obtained from relative(youngest brother), past medical records, and ER records.      Mr. Godwin Jeter is a 59 y.o. male who  has a past medical history of Muscular dystrophy.    He  presented to ED for evaluation after after family noted concerns for hematuria.  When EMS arrived, patient was noted to be hypotensive requiring Levophed to stabilize.  Brother reports that 3-4 days ago, patient was noted to have a fever which resolved after ibuprofen administration.  Brother denies any recent changes in patient other than hematuria.  Denies any upper or lower respiratory symptoms, or abdominal symptoms. At baseline, patient is bed-bound, fully dependent and requires assistance for his ADL, usually eats a pureed diet, uses a condom catheter, has very limited verbal ability to communicate.  He does not take any home medications.  Per brother, no recent concerns for aspiration or dysphagia, patient is at baseline mentation and is able to tolerate a pureed-like food diet  at home which typically consists of rice and soft foods.     ED course notable for vitals with tachycardia, hypotension.  Labs notable for WBC 27.46, hemoglobin 12.4, bicarb 19, glucose 225, calcium 8.2, albumin 3.1.  Urinalysis notable for WBC 20, RBC >100, blood 2+, ketones trace, glucose 3+, protein trace.  Chest x-ray nonacute.  CT C/A/P with IV contrast was completed with preliminary findings noted of  "patchy pulmonary opacities noted right lung compatible with multifocal right-sided pneumonia.  Aspiration not excluded in the correct clinical setting...."Urinary bladder wall thickening compatible with cystitis""    "

## 2025-05-11 NOTE — ED NOTES
Pt presents to the ED with c/o hematuria that onset earlier today. Per EMS, pt had fever a few days ago which resolved. When they arrived at the pt's home, they noted there was gross hematuria and that the urine was concentrated. Pt was found to be hypotensive and was initiated on Levophed to stabilize BP. On arrival BP is 99/54 (73) with Levo infusing at 4mcg/min per EMS. PMHx muscular dystrophy.

## 2025-05-11 NOTE — ASSESSMENT & PLAN NOTE
Patient's most recent phosphorus results are listed below.   Recent Labs     05/11/25  0444   PHOS 1.4*     Plan  - Will treat hypophosphatemia with repletion as necessitated

## 2025-05-11 NOTE — ASSESSMENT & PLAN NOTE
Patient's most recent potassium results are listed below.   Recent Labs     05/10/25  2120 05/11/25  0444   K 3.7 3.4*     Plan  - Replete potassium per protocol  - Monitor potassium Daily

## 2025-05-11 NOTE — ASSESSMENT & PLAN NOTE
-Patient's with Normocytic anemia..   -Hemoglobin downtrending.   -Etiology likely due to possibly nutritional etiology versus recent hematuria concerns.  -Current CBC reviewed-    Recent Labs   Lab 05/10/25  2120 05/11/25  0444   HGB 12.4* 11.1*         PLAN  - Monitor serial CBC and transfuse if patient becomes hemodynamically unstable, symptomatic or H/H drops below 7/21.  -Follow up iron studies

## 2025-05-12 LAB
ABSOLUTE NEUTROPHIL MANUAL (OHS): 25.3 K/UL
ALBUMIN SERPL BCP-MCNC: 2.3 G/DL (ref 3.5–5.2)
ALP SERPL-CCNC: 64 UNIT/L (ref 40–150)
ALT SERPL W/O P-5'-P-CCNC: 45 UNIT/L (ref 10–44)
ANION GAP (OHS): 7 MMOL/L (ref 8–16)
AST SERPL-CCNC: 22 UNIT/L (ref 11–45)
BACTERIA UR CULT: NORMAL
BILIRUB SERPL-MCNC: 0.4 MG/DL (ref 0.1–1)
BUN SERPL-MCNC: 5 MG/DL (ref 6–20)
CALCIUM SERPL-MCNC: 7.7 MG/DL (ref 8.7–10.5)
CHLORIDE SERPL-SCNC: 111 MMOL/L (ref 95–110)
CO2 SERPL-SCNC: 20 MMOL/L (ref 23–29)
CREAT SERPL-MCNC: 0.6 MG/DL (ref 0.5–1.4)
ERYTHROCYTE [DISTWIDTH] IN BLOOD BY AUTOMATED COUNT: 12.9 % (ref 11.5–14.5)
GFR SERPLBLD CREATININE-BSD FMLA CKD-EPI: >60 ML/MIN/1.73/M2
GLUCOSE SERPL-MCNC: 95 MG/DL (ref 70–110)
HCT VFR BLD AUTO: 35.3 % (ref 40–54)
HGB BLD-MCNC: 11.3 GM/DL (ref 14–18)
IRON SATN MFR SERPL: 6 % (ref 20–50)
IRON SERPL-MCNC: 13 UG/DL (ref 45–160)
LYMPHOCYTES NFR BLD MANUAL: 11 % (ref 18–48)
MAGNESIUM SERPL-MCNC: 1.8 MG/DL (ref 1.6–2.6)
MCH RBC QN AUTO: 29.7 PG (ref 27–31)
MCHC RBC AUTO-ENTMCNC: 32 G/DL (ref 32–36)
MCV RBC AUTO: 93 FL (ref 82–98)
MONOCYTES NFR BLD MANUAL: 9 % (ref 4–15)
NEUTROPHILS NFR BLD MANUAL: 77 % (ref 38–73)
NEUTS BAND NFR BLD MANUAL: 3 %
NUCLEATED RBC (/100WBC) (OHS): 0 /100 WBC
PHOSPHATE SERPL-MCNC: 1.7 MG/DL (ref 2.7–4.5)
PLATELET # BLD AUTO: 246 K/UL (ref 150–450)
PLATELET BLD QL SMEAR: ABNORMAL
PMV BLD AUTO: 9.2 FL (ref 9.2–12.9)
POTASSIUM SERPL-SCNC: 3.2 MMOL/L (ref 3.5–5.1)
PROT SERPL-MCNC: 6 GM/DL (ref 6–8.4)
RBC # BLD AUTO: 3.8 M/UL (ref 4.6–6.2)
SODIUM SERPL-SCNC: 138 MMOL/L (ref 136–145)
TIBC SERPL-MCNC: 223 UG/DL (ref 250–450)
TRANSFERRIN SERPL-MCNC: 151 MG/DL (ref 200–375)
WBC # BLD AUTO: 31.57 K/UL (ref 3.9–12.7)

## 2025-05-12 PROCEDURE — 85007 BL SMEAR W/DIFF WBC COUNT: CPT | Performed by: STUDENT IN AN ORGANIZED HEALTH CARE EDUCATION/TRAINING PROGRAM

## 2025-05-12 PROCEDURE — 25000003 PHARM REV CODE 250: Performed by: STUDENT IN AN ORGANIZED HEALTH CARE EDUCATION/TRAINING PROGRAM

## 2025-05-12 PROCEDURE — 84100 ASSAY OF PHOSPHORUS: CPT | Performed by: STUDENT IN AN ORGANIZED HEALTH CARE EDUCATION/TRAINING PROGRAM

## 2025-05-12 PROCEDURE — 80053 COMPREHEN METABOLIC PANEL: CPT | Performed by: STUDENT IN AN ORGANIZED HEALTH CARE EDUCATION/TRAINING PROGRAM

## 2025-05-12 PROCEDURE — 84466 ASSAY OF TRANSFERRIN: CPT | Performed by: STUDENT IN AN ORGANIZED HEALTH CARE EDUCATION/TRAINING PROGRAM

## 2025-05-12 PROCEDURE — 63600175 PHARM REV CODE 636 W HCPCS: Performed by: FAMILY MEDICINE

## 2025-05-12 PROCEDURE — 36415 COLL VENOUS BLD VENIPUNCTURE: CPT | Performed by: STUDENT IN AN ORGANIZED HEALTH CARE EDUCATION/TRAINING PROGRAM

## 2025-05-12 PROCEDURE — 63600175 PHARM REV CODE 636 W HCPCS: Performed by: STUDENT IN AN ORGANIZED HEALTH CARE EDUCATION/TRAINING PROGRAM

## 2025-05-12 PROCEDURE — 83735 ASSAY OF MAGNESIUM: CPT | Performed by: STUDENT IN AN ORGANIZED HEALTH CARE EDUCATION/TRAINING PROGRAM

## 2025-05-12 PROCEDURE — 21400001 HC TELEMETRY ROOM

## 2025-05-12 PROCEDURE — 25000003 PHARM REV CODE 250: Performed by: FAMILY MEDICINE

## 2025-05-12 RX ORDER — POTASSIUM CHLORIDE 7.45 MG/ML
10 INJECTION INTRAVENOUS
Status: COMPLETED | OUTPATIENT
Start: 2025-05-12 | End: 2025-05-12

## 2025-05-12 RX ADMIN — PIPERACILLIN AND TAZOBACTAM 4.5 G: 4; .5 INJECTION, POWDER, LYOPHILIZED, FOR SOLUTION INTRAVENOUS; PARENTERAL at 08:05

## 2025-05-12 RX ADMIN — POTASSIUM CHLORIDE 10 MEQ: 7.46 INJECTION, SOLUTION INTRAVENOUS at 08:05

## 2025-05-12 RX ADMIN — POTASSIUM CHLORIDE 10 MEQ: 7.46 INJECTION, SOLUTION INTRAVENOUS at 11:05

## 2025-05-12 RX ADMIN — POTASSIUM CHLORIDE 10 MEQ: 7.46 INJECTION, SOLUTION INTRAVENOUS at 09:05

## 2025-05-12 RX ADMIN — BISACODYL 10 MG: 10 SUPPOSITORY RECTAL at 08:05

## 2025-05-12 RX ADMIN — PIPERACILLIN AND TAZOBACTAM 4.5 G: 4; .5 INJECTION, POWDER, LYOPHILIZED, FOR SOLUTION INTRAVENOUS; PARENTERAL at 12:05

## 2025-05-12 RX ADMIN — PIPERACILLIN AND TAZOBACTAM 4.5 G: 4; .5 INJECTION, POWDER, LYOPHILIZED, FOR SOLUTION INTRAVENOUS; PARENTERAL at 03:05

## 2025-05-12 RX ADMIN — POTASSIUM CHLORIDE 10 MEQ: 7.46 INJECTION, SOLUTION INTRAVENOUS at 10:05

## 2025-05-12 NOTE — SUBJECTIVE & OBJECTIVE
Interval History: See hospital course for today      Review of Systems   Unable to perform ROS: Acuity of condition (sister provides collateral)     Objective:     Vital Signs (Most Recent):  Temp: 99.8 °F (37.7 °C) (05/12/25 1203)  Pulse: 104 (05/12/25 1203)  Resp: 19 (05/12/25 1203)  BP: 102/62 (05/12/25 1203)  SpO2: 98 % (05/12/25 1203) Vital Signs (24h Range):  Temp:  [98.9 °F (37.2 °C)-102.5 °F (39.2 °C)] 99.8 °F (37.7 °C)  Pulse:  [] 104  Resp:  [18-20] 19  SpO2:  [96 %-98 %] 98 %  BP: ()/(52-69) 102/62     Weight: 33.5 kg (73 lb 13.7 oz)  Body mass index is 14.42 kg/m².    Intake/Output Summary (Last 24 hours) at 5/12/2025 1258  Last data filed at 5/12/2025 0755  Gross per 24 hour   Intake 1370.96 ml   Output 1500 ml   Net -129.04 ml         Physical Exam  Vitals and nursing note reviewed. Exam conducted with a chaperone present (nursing).   Constitutional:       General: He is awake. He is not in acute distress.     Appearance: He is cachectic. He is ill-appearing. He is not toxic-appearing.   HENT:      Head: Normocephalic and atraumatic.   Cardiovascular:      Rate and Rhythm: Tachycardia present.   Pulmonary:      Effort: Pulmonary effort is normal. No respiratory distress.   Musculoskeletal:      Right lower leg: No edema.      Left lower leg: No edema.      Comments: contractures   Skin:     General: Skin is warm.      Coloration: Skin is pale.      Findings: Lesion present.   Neurological:      Mental Status: Mental status is at baseline.      Motor: Weakness present.   Psychiatric:         Behavior: Behavior is cooperative.               Significant Labs: All pertinent labs within the past 24 hours have been reviewed.  Blood Culture: negative growth to date   CBC:   Recent Labs   Lab 05/10/25  2120 05/11/25  0444 05/12/25  0538   WBC 27.46* 33.35* 31.57*   HGB 12.4* 11.1* 11.3*   HCT 37.5* 33.8* 35.3*    234 246     CMP:   Recent Labs   Lab 05/10/25  2120 05/11/25  0445  05/12/25  0538    136 138   K 3.7 3.4* 3.2*    109 111*   CO2 19* 18* 20*   * 136* 95   BUN 21* 10 5*   CREATININE 0.7 0.6 0.6   CALCIUM 8.2* 8.0* 7.7*   PROT 7.0 6.4 6.0   ALBUMIN 3.1* 2.8* 2.3*   BILITOT 0.4 0.6 0.4   ALKPHOS 57 58 64   AST 14 38 22   ALT 14 43 45*   ANIONGAP 8 9 7*     Lactic Acid:   Recent Labs   Lab 05/10/25  2120 05/11/25  0131   LACTATE 2.2 1.0     Urine Culture:   Recent Labs   Lab 05/10/25  2126   LABURIN Multiple organisms isolated. None in predominance. Repeat if clinically necessary.       Significant Imaging: I have reviewed all pertinent imaging results/findings within the past 24 hours.  CT: I have reviewed all pertinent results/findings within the past 24 hours and my personal findings are:  chest abdomen pelvis with contrast showing possible early pneumonia vs aspiration, urinary bladder wall thickening, no hydronephrosis, possible constipation

## 2025-05-12 NOTE — CONSULTS
[facility] - Telemetry ([facility])  Wound Care    Patient Name:  Godwin Jeter   MRN:  [id number]  Date: 5/12/2025  Diagnosis: Septic shock    History:     Past Medical History:   Diagnosis Date    Muscular dystrophy        Social History[1]    Precautions:     Allergies as of 05/10/2025    (No Known Allergies)       WO Assessment Details/Treatment     Consulted on this 60 y/o M patient due to present on admission wounds. Patient admitted with septic shock and hematuria has PMH significant for muscular dystrophy, bedbound at baseline.   Today he is awake and alert in bed, multiple family members in room.   Skin assessed with primary nurse Heidi.   Red lacy rash is noted to bilateral plantar feet, right hip, right flank and right back, right shoulder and neck, and palms of bilateral hands. Unknown etiology. Moisturizer applied. Recommend consider dermatology f/u as OP, may benefit from topical steroid cream if persists.  Scar tissue noted to sacrum and bilateral hips from prior healed pressure injuries, preventive foam dressings intact/replaced.  Bilateral heels intact, preventive foam dressings maintained.  Right ear blanchable redness and decreased cartilage tone causing flat, enlarged, floppy ear - folded forward due to patient usual positioning. Recommend paint with cavilon daily, attempt to reposition patient/head off of ear intermittently.  Patient positioned with pillow support on right side. Attempted to reposition to left side, however, patient c/o difficulty breathing in that position, so returned to right side.    Patient is on specialty Agility AYAL[employer] Immerse bed.    Recommendations made to primary team for rash care - f/u with dermatology as OP; pressure injury prevention interventions       05/12/25 1105   WOCN Assessment   WOCN Total Time (mins) 45   Visit Date 05/12/25   Visit Time 1105   Consult Type New   WOCN Speciality Wound   Wound other  (rash, old healed pressure injuries/scars)   Intervention  assessed;chart review;coordination of care;team conference;orders   Teaching on-going   [REMOVED]      Wound 05/11/25 0100 Other (comment) Right lateral Hip #1   Final Assessment Date/Final Assessment Time: 05/12/25 1202  Date First Assessed/Time First Assessed: 05/11/25 0100   Present on Original Admission: Yes  Primary Wound Type: Other (comment)  Side: Right  Orientation: lateral  Location: Hip  Wound Numbe...   Wound Image    Periwound Area Scar tissue   [REMOVED]      Wound 05/11/25 0100 Other (comment) Right upper Scapula #2   Final Assessment Date/Final Assessment Time: 05/12/25 1202  Date First Assessed/Time First Assessed: 05/11/25 0100   Present on Original Admission: Yes  Primary Wound Type: Other (comment)  Side: Right  Orientation: upper  Location: Scapula  Wound Num...   Periwound Area Scar tissue        Wound 05/11/25 0100 Rash Right lateral Back   Date First Assessed/Time First Assessed: 05/11/25 0100   Present on Original Admission: Yes  Primary Wound Type: (c) Rash  Side: Right  Orientation: lateral  Location: (c) Back   Distribution generalized   Characteristics redness/erythema   Color red   Appearance Red   Care Cleansed with:;Soap and water;Applied:;Moisturizing agent   [REMOVED]      Wound 05/11/25 0100 Other (comment) Left anterior;medial Knee #5   Final Assessment Date/Final Assessment Time: 05/12/25 1204  Date First Assessed/Time First Assessed: 05/11/25 0100   Present on Original Admission: Yes  Primary Wound Type: Other (comment)  Side: Left  Orientation: anterior;medial  Location: Knee  Wou...   Periwound Area Scar tissue   [REMOVED]      Wound 05/11/25 0100 Other (comment) Left lateral Hip #6   Final Assessment Date/Final Assessment Time: 05/12/25 1209  Date First Assessed/Time First Assessed: 05/11/25 0100   Present on Original Admission: Yes  Primary Wound Type: Other (comment)  Side: Left  Orientation: lateral  Location: Hip  Wound Number...   Wound Image    Periwound Area Scar  tissue;Redness   Care Cleansed with:;Sterile normal saline;Applied:;Skin Barrier   Dressing Foam        Wound 05/11/25 0100 Rash Right anterior Palm   Date First Assessed/Time First Assessed: 05/11/25 0100   Primary Wound Type: Rash  Side: Right  Orientation: anterior  Location: Palm   Distribution generalized   Characteristics redness/erythema   Color red        Wound 05/11/25 0100 Rash Left anterior Palm   Date First Assessed/Time First Assessed: 05/11/25 0100   Primary Wound Type: Rash  Side: Left  Orientation: anterior  Location: Palm   Distribution generalized   Characteristics redness/erythema   Color red        Wound 05/11/25 0100 Rash Right plantar Foot   Date First Assessed/Time First Assessed: 05/11/25 0100   Present on Original Admission: Yes  Primary Wound Type: Rash  Side: Right  Orientation: plantar  Location: Foot   Distribution generalized   Characteristics redness/erythema   Color red   [REMOVED]      Wound 05/11/25 0100 Other (comment) Sacral spine   Final Assessment Date/Final Assessment Time: 05/12/25 1210  Date First Assessed/Time First Assessed: 05/11/25 0100   Present on Original Admission: Yes  Primary Wound Type: Other (comment)  Location: Sacral spine  Wound Outcome: Healed   Appearance [place]   Periwound Area Scar tissue       05/12/2025         [1]   Social History  Socioeconomic History    Marital status: Single   Tobacco Use    Smoking status: Never   Substance and Sexual Activity    Alcohol use: No    Drug use: No    Sexual activity: Never     Social Drivers of Health     Financial Resource Strain: Patient Unable To Answer (5/11/2025)    Overall Financial Resource Strain (CARDIA)     Difficulty of Paying Living Expenses: Patient unable to answer   Food Insecurity: Patient Unable To Answer (5/11/2025)    Hunger Vital Sign     Worried About Running Out of Food in the Last Year: Patient unable to answer     Ran Out of Food in the Last Year: Patient unable to answer   Transportation Needs:  Patient Unable To Answer (5/11/2025)    PRAPARE - Transportation     Lack of Transportation (Medical): Patient unable to answer     Lack of Transportation (Non-Medical): Patient unable to answer   Stress: Patient Unable To Answer (5/11/2025)    [employer] - Occupational Stress Questionnaire     Feeling of Stress : Patient unable to answer   Housing Stability: Patient Unable To Answer (5/11/2025)    Housing Stability Vital Sign     Unable to Pay for Housing in the Last Year: Patient unable to answer     Homeless in the Last Year: Patient unable to answer

## 2025-05-12 NOTE — CONSULTS
05/12/25 1245   Post-Acute Status   Post-Acute Authorization Hospice   Hospice Status Referrals Sent   Discharge Delays None known at this time   Discharge Plan   Discharge Plan A Hospice/home     Referral sent to [facility], for Home Hospice set up.     14 38 Per [facility], with [facility], Patient is set up with Hospice services. [facility] stated they will order equipment to be delivered tonight and plan for DC tomorrow.     [facility] will continue to follow and assist as needed.

## 2025-05-12 NOTE — ASSESSMENT & PLAN NOTE
Patient's most recent potassium results are listed below.   Recent Labs     05/10/25  2120 05/11/25  0444 05/12/25  0538   K 3.7 3.4* 3.2*     Plan  - Replete potassium per protocol  - Monitor potassium Daily

## 2025-05-12 NOTE — ASSESSMENT & PLAN NOTE
"-59 y.o. male with muscular dystrophy who presented to ED for evaluation after after family noted concerns for hematuria.   -When EMS arrived, patient was noted to be hypotensive requiring Levophed to stabilize.  Brother reports that 3-4 days ago, patient was noted to have a fever which resolved after ibuprofen administration.    -ED course notable for vitals with tachycardia, hypotension.  Hospital course also notable for recurrent fevers   -Initial workup included: Labs notable for WBC 27.46, Urinalysis notable for WBC 20, RBC >100, blood 2+, ketones trace, glucose 3+, protein trace.  Chest x-ray nonacute.  CT C/A/P with IV contrast was completed with preliminary findings noted of  "patchy pulmonary opacities noted right lung compatible with multifocal right-sided pneumonia.  Aspiration not excluded in the correct clinical setting...."Urinary bladder wall thickening compatible with cystitis""      This patient does have evidence of infective focus  My overall impression is septic shock due to SBP < 90.  Required Levophed administration during EMS course.    Source: Urinary Tract versus Respiratory tract    Latest lactate reviewed-  Recent Labs   Lab 05/10/25  2120 05/11/25  0131   LACTATE 2.2 1.0     PLAN  - Follow up final read of CT imaging  - Follow-up blood cultures, urine cultures and deescalate antibiotics as appropriate  - Telemetry. Strict I/Os   -Source control achieved by:  Antibiotics  Antibiotics (From admission, onward)      Start     Stop Route Frequency Ordered    05/11/25 0345  piperacillin-tazobactam (ZOSYN) 4.5 g in D5W 100 mL IVPB (MB+)         -- IV Every 8 hours (non-standard times) 05/11/25 0230          Antibiotics (From admission, onward)      Start     Stop Route Frequency Ordered    05/11/25 0345  piperacillin-tazobactam (ZOSYN) 4.5 g in D5W 100 mL IVPB (MB+)         -- IV Every 8 hours (non-standard times) 05/11/25 0230          Microbiology Results (last 7 days)       Procedure " Component Value Units Date/Time    Urine culture [[id number]] Collected: 05/10/25 2126    Order Status: Completed Specimen: Urine Updated: 05/12/25 0741     Urine Culture Multiple organisms isolated. None in predominance. Repeat if clinically necessary.    Blood culture x two cultures. Draw prior to antibiotics. [[id number]]  (Normal) Collected: 05/10/25 2119    Order Status: Completed Specimen: Blood from Peripheral, Forearm, Left Updated: 05/12/25 0500     Blood Culture No Growth After 24 Hours    Blood culture x two cultures. Draw prior to antibiotics. [[id number]]  (Normal) Collected: 05/10/25 2121    Order Status: Completed Specimen: Blood from Peripheral, Forearm, Right Updated: 05/12/25 0500     Blood Culture No Growth After 24 Hours    Culture, MRSA [[id number]] Collected: 05/11/25 0441    Order Status: Sent Specimen: MRSA source from Nares, Left Updated: 05/11/25 0505          Microbiology Results (last 7 days)       Procedure Component Value Units Date/Time    Urine culture [[id number]] Collected: 05/10/25 2126    Order Status: Completed Specimen: Urine Updated: 05/12/25 0741     Urine Culture Multiple organisms isolated. None in predominance. Repeat if clinically necessary.    Blood culture x two cultures. Draw prior to antibiotics. [[id number]]  (Normal) Collected: 05/10/25 2119    Order Status: Completed Specimen: Blood from Peripheral, Forearm, Left Updated: 05/12/25 0500     Blood Culture No Growth After 24 Hours    Blood culture x two cultures. Draw prior to antibiotics. [[id number]]  (Normal) Collected: 05/10/25 2121    Order Status: Completed Specimen: Blood from Peripheral, Forearm, Right Updated: 05/12/25 0500     Blood Culture No Growth After 24 Hours    Culture, MRSA [[id number]] Collected: 05/11/25 0441    Order Status: Sent Specimen: MRSA source from Nares, Left Updated: 05/11/25 0505        Ct imaging showing urinary bladder wall thickening  Urine culture with multiple organisms   Blood  cultures negative growth to date   Continue intravenous antibiotic(s)   Suspect aspiration   Aspiration risk  Consider systemic steroids pending procalcitonin   Procalcitonin in am   Deescalate antibiotic(s) as able

## 2025-05-12 NOTE — PLAN OF CARE
Nutrition Recommendations/Interventions on 5/12/25:   Continue regular diet as tolerated with texture/consistency modifications per SLP recommendations.   Boost Plus BID and Anibal BID to increase kcal/protein intake for weight gain and wound healing.  Will monitor % intake meals + supplements, diet tolerance, bowel activity, weight, labs.       Goals:  Goal: Meet greater than 80% of nutritional needs by follow-up. (new)  Goal: Meet greater than 80% of nutritional needs by follow-up. (new)      Dacia Cardoza, BARRY, LDN, [facility]

## 2025-05-12 NOTE — ASSESSMENT & PLAN NOTE
"-59 y.o. male with muscular dystrophy who presented to ED for evaluation after after family noted concerns for hematuria.   -When EMS arrived, patient was noted to be hypotensive requiring Levophed to stabilize.  Brother reports that 3-4 days ago, patient was noted to have a fever which resolved after ibuprofen administration.    -ED course notable for vitals with tachycardia, hypotension.  Hospital course also notable for recurrent fevers   -Initial workup included: Labs notable for WBC 27.46, Urinalysis notable for WBC 20, RBC >100, blood 2+, ketones trace, glucose 3+, protein trace.  Chest x-ray nonacute.  CT C/A/P with IV contrast was completed with preliminary findings noted of  "patchy pulmonary opacities noted right lung compatible with multifocal right-sided pneumonia.  Aspiration not excluded in the correct clinical setting...."Urinary bladder wall thickening compatible with cystitis""      This patient does have evidence of infective focus  My overall impression is septic shock due to SBP < 90.  Required Levophed administration during EMS course.    Source: Urinary Tract versus Respiratory tract    Latest lactate reviewed-  Recent Labs   Lab 05/10/25  2120 05/11/25  0131   LACTATE 2.2 1.0     PLAN  - Follow up final read of CT imaging  - Follow-up blood cultures, urine cultures and deescalate antibiotics as appropriate  - Telemetry. Strict I/Os   -Source control achieved by:  Antibiotics  Antibiotics (From admission, onward)      Start     Stop Route Frequency Ordered    05/11/25 0345  piperacillin-tazobactam (ZOSYN) 4.5 g in D5W 100 mL IVPB (MB+)         -- IV Every 8 hours (non-standard times) 05/11/25 0230          Antibiotics (From admission, onward)      Start     Stop Route Frequency Ordered    05/11/25 0345  piperacillin-tazobactam (ZOSYN) 4.5 g in D5W 100 mL IVPB (MB+)         -- IV Every 8 hours (non-standard times) 05/11/25 0230          Microbiology Results (last 7 days)       Procedure " Component Value Units Date/Time    Urine culture [[id number]] Collected: 05/10/25 2126    Order Status: Completed Specimen: Urine Updated: 05/12/25 0741     Urine Culture Multiple organisms isolated. None in predominance. Repeat if clinically necessary.    Blood culture x two cultures. Draw prior to antibiotics. [[id number]]  (Normal) Collected: 05/10/25 2119    Order Status: Completed Specimen: Blood from Peripheral, Forearm, Left Updated: 05/12/25 0500     Blood Culture No Growth After 24 Hours    Blood culture x two cultures. Draw prior to antibiotics. [[id number]]  (Normal) Collected: 05/10/25 2121    Order Status: Completed Specimen: Blood from Peripheral, Forearm, Right Updated: 05/12/25 0500     Blood Culture No Growth After 24 Hours    Culture, MRSA [[id number]] Collected: 05/11/25 0441    Order Status: Sent Specimen: MRSA source from Nares, Left Updated: 05/11/25 0505          Microbiology Results (last 7 days)       Procedure Component Value Units Date/Time    Urine culture [[id number]] Collected: 05/10/25 2126    Order Status: Completed Specimen: Urine Updated: 05/12/25 0741     Urine Culture Multiple organisms isolated. None in predominance. Repeat if clinically necessary.    Blood culture x two cultures. Draw prior to antibiotics. [[id number]]  (Normal) Collected: 05/10/25 2119    Order Status: Completed Specimen: Blood from Peripheral, Forearm, Left Updated: 05/12/25 0500     Blood Culture No Growth After 24 Hours    Blood culture x two cultures. Draw prior to antibiotics. [[id number]]  (Normal) Collected: 05/10/25 2121    Order Status: Completed Specimen: Blood from Peripheral, Forearm, Right Updated: 05/12/25 0500     Blood Culture No Growth After 24 Hours    Culture, MRSA [[id number]] Collected: 05/11/25 0441    Order Status: Sent Specimen: MRSA source from Nares, Left Updated: 05/11/25 0505        Ct imaging showing urinary bladder wall thickening  Urine culture with multiple organisms   Blood  cultures negative growth to date   Continue intravenous antibiotic(s)   Suspect aspiration   Aspiration risk  Consider systemic steroids pending procalcitonin   Procalcitonin in am   Deescalate antibiotic(s) as able

## 2025-05-12 NOTE — ACP (ADVANCE CARE PLANNING)
Hospice  I did explain the role for hospice care at this stage of the patient's illness, including its ability to help the patient live with the best quality of life possible.  We willbe making a hospice referral.

## 2025-05-12 NOTE — ASSESSMENT & PLAN NOTE
-Patient's with Normocytic anemia..   -Hemoglobin downtrending.   -Etiology likely due to possibly nutritional etiology versus recent hematuria concerns.  -Current CBC reviewed-    Recent Labs   Lab 05/10/25  2120 05/11/25  0444 05/12/25  0538   HGB 12.4* 11.1* 11.3*         PLAN  - Monitor serial CBC and transfuse if patient becomes hemodynamically unstable, symptomatic or H/H drops below 7/21.  Iron studies with low iron and iron sat  Supplement with iron once infection ruled out

## 2025-05-12 NOTE — ASSESSMENT & PLAN NOTE
"-59 y.o. male with muscular dystrophy who presented to ED for evaluation after after family noted concerns for hematuria.   -When EMS arrived, patient was noted to be hypotensive requiring Levophed to stabilize.  Brother reports that 3-4 days ago, patient was noted to have a fever which resolved after ibuprofen administration.    -ED course notable for vitals with tachycardia, hypotension.  Hospital course also notable for recurrent fevers   -Initial workup included: Labs notable for WBC 27.46, Urinalysis notable for WBC 20, RBC >100, blood 2+, ketones trace, glucose 3+, protein trace.  Chest x-ray nonacute.  CT C/A/P with IV contrast was completed with preliminary findings noted of  "patchy pulmonary opacities noted right lung compatible with multifocal right-sided pneumonia.  Aspiration not excluded in the correct clinical setting...."Urinary bladder wall thickening compatible with cystitis""      This patient does have evidence of infective focus  My overall impression is septic shock due to SBP < 90.  Required Levophed administration during EMS course.    Source: Urinary Tract versus Respiratory tract    Latest lactate reviewed-  Recent Labs   Lab 05/10/25  2120 05/11/25  0131   LACTATE 2.2 1.0     PLAN  - Follow up final read of CT imaging  - Follow-up blood cultures, urine cultures and deescalate antibiotics as appropriate  - Telemetry. Strict I/Os   -Source control achieved by:  Antibiotics  Antibiotics (From admission, onward)      Start     Stop Route Frequency Ordered    05/11/25 0345  piperacillin-tazobactam (ZOSYN) 4.5 g in D5W 100 mL IVPB (MB+)         -- IV Every 8 hours (non-standard times) 05/11/25 0230          Antibiotics (From admission, onward)      Start     Stop Route Frequency Ordered    05/11/25 0345  piperacillin-tazobactam (ZOSYN) 4.5 g in D5W 100 mL IVPB (MB+)         -- IV Every 8 hours (non-standard times) 05/11/25 0230          Microbiology Results (last 7 days)       Procedure " Component Value Units Date/Time    Urine culture [[id number]] Collected: 05/10/25 2126    Order Status: Completed Specimen: Urine Updated: 05/12/25 0741     Urine Culture Multiple organisms isolated. None in predominance. Repeat if clinically necessary.    Blood culture x two cultures. Draw prior to antibiotics. [[id number]]  (Normal) Collected: 05/10/25 2119    Order Status: Completed Specimen: Blood from Peripheral, Forearm, Left Updated: 05/12/25 0500     Blood Culture No Growth After 24 Hours    Blood culture x two cultures. Draw prior to antibiotics. [[id number]]  (Normal) Collected: 05/10/25 2121    Order Status: Completed Specimen: Blood from Peripheral, Forearm, Right Updated: 05/12/25 0500     Blood Culture No Growth After 24 Hours    Culture, MRSA [[id number]] Collected: 05/11/25 0441    Order Status: Sent Specimen: MRSA source from Nares, Left Updated: 05/11/25 0505          Microbiology Results (last 7 days)       Procedure Component Value Units Date/Time    Urine culture [[id number]] Collected: 05/10/25 2126    Order Status: Completed Specimen: Urine Updated: 05/12/25 0741     Urine Culture Multiple organisms isolated. None in predominance. Repeat if clinically necessary.    Blood culture x two cultures. Draw prior to antibiotics. [[id number]]  (Normal) Collected: 05/10/25 2119    Order Status: Completed Specimen: Blood from Peripheral, Forearm, Left Updated: 05/12/25 0500     Blood Culture No Growth After 24 Hours    Blood culture x two cultures. Draw prior to antibiotics. [[id number]]  (Normal) Collected: 05/10/25 2121    Order Status: Completed Specimen: Blood from Peripheral, Forearm, Right Updated: 05/12/25 0500     Blood Culture No Growth After 24 Hours    Culture, MRSA [[id number]] Collected: 05/11/25 0441    Order Status: Sent Specimen: MRSA source from Nares, Left Updated: 05/11/25 0505        Ct imaging showing urinary bladder wall thickening  Urine culture with multiple organisms   Blood  cultures negative growth to date   Continue intravenous antibiotic(s)   Suspect aspiration   Aspiration risk  Consider systemic steroids pending procalcitonin   Procalcitonin in am   Deescalate antibiotic(s) as able

## 2025-05-12 NOTE — PROGRESS NOTES
"[facility] Medicine  Progress Note    Patient Name: Godwin Jeter  MRN: [id number]  Patient Class: IP- Inpatient   Admission Date: 5/10/2025  Length of Stay: 1 days  Attending Physician: Evangelina Morris MD  Primary Care Provider: Kristy, Primary Doctor        Subjective     Principal Problem:Septic shock        HPI:  Patient information was obtained from relative(youngest brother), past medical records, and ER records.      Mr. Godwin Jeter is a 59 y.o. male who  has a past medical history of Muscular dystrophy.    He  presented to ED for evaluation after after family noted concerns for hematuria.  When EMS arrived, patient was noted to be hypotensive requiring Levophed to stabilize.  Brother reports that 3-4 days ago, patient was noted to have a fever which resolved after ibuprofen administration.  Brother denies any recent changes in patient other than hematuria.  Denies any upper or lower respiratory symptoms, or abdominal symptoms. At baseline, patient is bed-bound, fully dependent and requires assistance for his ADL, usually eats a pureed diet, uses a condom catheter, has very limited verbal ability to communicate.  He does not take any home medications.  Per brother, no recent concerns for aspiration or dysphagia, patient is at baseline mentation and is able to tolerate a pureed-like food diet  at home which typically consists of rice and soft foods.     ED course notable for vitals with tachycardia, hypotension.  Labs notable for WBC 27.46, hemoglobin 12.4, bicarb 19, glucose 225, calcium 8.2, albumin 3.1.  Urinalysis notable for WBC 20, RBC >100, blood 2+, ketones trace, glucose 3+, protein trace.  Chest x-ray nonacute.  CT C/A/P with IV contrast was completed with preliminary findings noted of  "patchy pulmonary opacities noted right lung compatible with multifocal right-sided pneumonia.  Aspiration not excluded in the correct clinical setting...."Urinary bladder wall thickening " compatible with cystitis"      Overview/Hospital Course:  5/12 admitted for septic shock with hypotension requiring pressor support, fever, tachycardia and leukocytosis. Patient's brother also noted gross hematuria. Imaging with possible aspiration vs early pneumonia, urinary bladder wall thickening and possible constipation.  Multiple organisms on urine culture. Blood cultures negative growth to date. Speech consulted and recommended npo, but family wish to pursue hospice. Continue treatment and discharge home on hospice tomorrow.    Interval History: See hospital course for today      Review of Systems   Unable to perform ROS: Acuity of condition (sister provides collateral)     Objective:     Vital Signs (Most Recent):  Temp: 99.8 °F (37.7 °C) (05/12/25 1203)  Pulse: 104 (05/12/25 1203)  Resp: 19 (05/12/25 1203)  BP: 102/62 (05/12/25 1203)  SpO2: 98 % (05/12/25 1203) Vital Signs (24h Range):  Temp:  [98.9 °F (37.2 °C)-102.5 °F (39.2 °C)] 99.8 °F (37.7 °C)  Pulse:  [] 104  Resp:  [18-20] 19  SpO2:  [96 %-98 %] 98 %  BP: ()/(52-69) 102/62     Weight: 33.5 kg (73 lb 13.7 oz)  Body mass index is 14.42 kg/m².    Intake/Output Summary (Last 24 hours) at 5/12/2025 1258  Last data filed at 5/12/2025 0755  Gross per 24 hour   Intake 1370.96 ml   Output 1500 ml   Net -129.04 ml         Physical Exam  Vitals and nursing note reviewed. Exam conducted with a chaperone present (nursing).   Constitutional:       General: He is awake. He is not in acute distress.     Appearance: He is cachectic. He is ill-appearing. He is not toxic-appearing.   HENT:      Head: Normocephalic and atraumatic.   Cardiovascular:      Rate and Rhythm: Tachycardia present.   Pulmonary:      Effort: Pulmonary effort is normal. No respiratory distress.   Musculoskeletal:      Right lower leg: No edema.      Left lower leg: No edema.      Comments: contractures   Skin:     General: Skin is warm.      Coloration: Skin is pale.      Findings:  Lesion present.   Neurological:      Mental Status: Mental status is at baseline.      Motor: Weakness present.   Psychiatric:         Behavior: Behavior is cooperative.               Significant Labs: All pertinent labs within the past 24 hours have been reviewed.  Blood Culture: negative growth to date   CBC:   Recent Labs   Lab 05/10/25  2120 05/11/25  0444 05/12/25  0538   WBC 27.46* 33.35* 31.57*   HGB 12.4* 11.1* 11.3*   HCT 37.5* 33.8* 35.3*    234 246     CMP:   Recent Labs   Lab 05/10/25  2120 05/11/25  0444 05/12/25  0538    136 138   K 3.7 3.4* 3.2*    109 111*   CO2 19* 18* 20*   * 136* 95   BUN 21* 10 5*   CREATININE 0.7 0.6 0.6   CALCIUM 8.2* 8.0* 7.7*   PROT 7.0 6.4 6.0   ALBUMIN 3.1* 2.8* 2.3*   BILITOT 0.4 0.6 0.4   ALKPHOS 57 58 64   AST 14 38 22   ALT 14 43 45*   ANIONGAP 8 9 7*     Lactic Acid:   Recent Labs   Lab 05/10/25  2120 05/11/25  0131   LACTATE 2.2 1.0     Urine Culture:   Recent Labs   Lab 05/10/25  2126   LABURIN Multiple organisms isolated. None in predominance. Repeat if clinically necessary.       Significant Imaging: I have reviewed all pertinent imaging results/findings within the past 24 hours.  CT: I have reviewed all pertinent results/findings within the past 24 hours and my personal findings are:  chest abdomen pelvis with contrast showing possible early pneumonia vs aspiration, urinary bladder wall thickening, no hydronephrosis, possible constipation      Assessment & Plan  Septic shock  UTI (urinary tract infection)  Multifocal pneumonia  -59 y.o. male with muscular dystrophy who presented to ED for evaluation after after family noted concerns for hematuria.   -When EMS arrived, patient was noted to be hypotensive requiring Levophed to stabilize.  Brother reports that 3-4 days ago, patient was noted to have a fever which resolved after ibuprofen administration.    -ED course notable for vitals with tachycardia, hypotension.  Hospital course also  "notable for recurrent fevers   -Initial workup included: Labs notable for WBC 27.46, Urinalysis notable for WBC 20, RBC >100, blood 2+, ketones trace, glucose 3+, protein trace.  Chest x-ray nonacute.  CT C/A/P with IV contrast was completed with preliminary findings noted of  "patchy pulmonary opacities noted right lung compatible with multifocal right-sided pneumonia.  Aspiration not excluded in the correct clinical setting...."Urinary bladder wall thickening compatible with cystitis""      This patient does have evidence of infective focus  My overall impression is septic shock due to SBP < 90.  Required Levophed administration during EMS course.    Source: Urinary Tract versus Respiratory tract    Latest lactate reviewed-  Recent Labs   Lab 05/10/25  2120 05/11/25  0131   LACTATE 2.2 1.0     PLAN  - Follow up final read of CT imaging  - Follow-up blood cultures, urine cultures and deescalate antibiotics as appropriate  - Telemetry. Strict I/Os   -Source control achieved by:  Antibiotics  Antibiotics (From admission, onward)      Start     Stop Route Frequency Ordered    05/11/25 0345  piperacillin-tazobactam (ZOSYN) 4.5 g in D5W 100 mL IVPB (MB+)         -- IV Every 8 hours (non-standard times) 05/11/25 0230          Antibiotics (From admission, onward)      Start     Stop Route Frequency Ordered    05/11/25 0345  piperacillin-tazobactam (ZOSYN) 4.5 g in D5W 100 mL IVPB (MB+)         -- IV Every 8 hours (non-standard times) 05/11/25 0230          Microbiology Results (last 7 days)       Procedure Component Value Units Date/Time    Urine culture [[id number]] Collected: 05/10/25 2126    Order Status: Completed Specimen: Urine Updated: 05/12/25 0741     Urine Culture Multiple organisms isolated. None in predominance. Repeat if clinically necessary.    Blood culture x two cultures. Draw prior to antibiotics. [[id number]]  (Normal) Collected: 05/10/25 2119    Order Status: Completed Specimen: Blood from Peripheral, " Forearm, Left Updated: 05/12/25 0500     Blood Culture No Growth After 24 Hours    Blood culture x two cultures. Draw prior to antibiotics. [[id number]]  (Normal) Collected: 05/10/25 2121    Order Status: Completed Specimen: Blood from Peripheral, Forearm, Right Updated: 05/12/25 0500     Blood Culture No Growth After 24 Hours    Culture, MRSA [[id number]] Collected: 05/11/25 0441    Order Status: Sent Specimen: MRSA source from Nares, Left Updated: 05/11/25 0505          Microbiology Results (last 7 days)       Procedure Component Value Units Date/Time    Urine culture [[id number]] Collected: 05/10/25 2126    Order Status: Completed Specimen: Urine Updated: 05/12/25 0741     Urine Culture Multiple organisms isolated. None in predominance. Repeat if clinically necessary.    Blood culture x two cultures. Draw prior to antibiotics. [[id number]]  (Normal) Collected: 05/10/25 2119    Order Status: Completed Specimen: Blood from Peripheral, Forearm, Left Updated: 05/12/25 0500     Blood Culture No Growth After 24 Hours    Blood culture x two cultures. Draw prior to antibiotics. [[id number]]  (Normal) Collected: 05/10/25 2121    Order Status: Completed Specimen: Blood from Peripheral, Forearm, Right Updated: 05/12/25 0500     Blood Culture No Growth After 24 Hours    Culture, MRSA [[id number]] Collected: 05/11/25 0441    Order Status: Sent Specimen: MRSA source from Nares, Left Updated: 05/11/25 0505        Ct imaging showing urinary bladder wall thickening  Urine culture with multiple organisms   Blood cultures negative growth to date   Continue intravenous antibiotic(s)   Suspect aspiration   Aspiration risk  Consider systemic steroids pending procalcitonin   Procalcitonin in am   Deescalate antibiotic(s) as able       Hematuria  Likely secondary to UTI.  Resolution noted on presentation with clear appearing urine in collection.    PLAN:  No further episodes of hematuria  Ct imaging suggests acute cystitis   Urine  culture growing multiple organisms  Empiric treatment for aspiration pneumonia with zosyn  Continue intravenous antibiotic(s)       Normocytic anemia  -Patient's with Normocytic anemia..   -Hemoglobin downtrending.   -Etiology likely due to possibly nutritional etiology versus recent hematuria concerns.  -Current CBC reviewed-    Recent Labs   Lab 05/10/25  2120 05/11/25  0444 05/12/25  0538   HGB 12.4* 11.1* 11.3*         PLAN  - Monitor serial CBC and transfuse if patient becomes hemodynamically unstable, symptomatic or H/H drops below 7/21.  Iron studies with low iron and iron sat  Supplement with iron once infection ruled out    Hypophosphatemia  Patient's most recent phosphorus results are listed below.   Recent Labs     05/11/25 0444 05/12/25 0538   PHOS 1.4* 1.7*     Plan  - Will treat hypophosphatemia with repletion as necessitated  Hypokalemia  Patient's most recent potassium results are listed below.   Recent Labs     05/10/25  2120 05/11/25  0444 05/12/25  0538   K 3.7 3.4* 3.2*     Plan  - Replete potassium per protocol  - Monitor potassium Daily  At high risk for aspiration  Family does not want to restrict patient's diet  Hospice referral placed       VTE Risk Mitigation (From admission, onward)           Ordered     IP VTE LOW RISK PATIENT  Once         05/11/25 0037     Place sequential compression device  Until discontinued         05/11/25 0037                    Discharge Planning   AMOL:      Code Status: Full Code   Medical Readiness for Discharge Date:   Discharge Plan A: Hospice/home   Discharge Delays: None known at this time                    Evangelina Morris MD  Department of Hospital Medicine   '[facility] ([facility])

## 2025-05-12 NOTE — PLAN OF CARE
A208/A208 RAEANN Jeter is a 59 y.o.male admitted on 5/10/2025 for Septic shock   Code Status: Full Code MRN: [id number]   Review of patient's allergies indicates:  No Known Allergies  Past Medical History:   Diagnosis Date    Muscular dystrophy       PRN meds    acetaminophen, 650 mg, Q6H PRN  acetaminophen, 650 mg, Q6H PRN  dextrose 50%, 12.5 g, PRN  dextrose 50%, 25 g, PRN  glucagon (human recombinant), 1 mg, PRN  glucose, 16 g, PRN  glucose, 24 g, PRN  hydrALAZINE, 5 mg, Q6H PRN  melatonin, 6 mg, Nightly PRN  naloxone, 0.02 mg, PRN  ondansetron, 4 mg, Q8H PRN  polyethylene glycol, 17 g, BID PRN  prochlorperazine, 2.5 mg, Q8H PRN  sodium chloride 0.9%, 10 mL, Q12H PRN      Chart check completed. Will continue plan of care.     Dc home tomorrow with hospice      Orientation: disoriented to, time, place, situation  Burney Coma Scale Score: 13     Lead Monitored: Lead II Rhythm: sinus tachycardia    Cardiac/Telemetry Box Number: [id number]    Last Bowel Movement: 05/11/25  Diet Pureed (IDDSI Level 4)  Voiding Characteristics: incontinence, external catheter  Samuel Score: 11  Fall Risk Score: 19  Accucheck []   Freq?      Lines/Drains/Airways       Drain  Duration             Male External Urinary Catheter 05/10/25 2248 1 day              Peripheral Intravenous Line  Duration                  Peripheral IV - Single Lumen 05/10/25 2118 20 G Distal;Posterior;Right Forearm 1 day

## 2025-05-12 NOTE — ASSESSMENT & PLAN NOTE
Likely secondary to UTI.  Resolution noted on presentation with clear appearing urine in collection.    PLAN:  No further episodes of hematuria  Ct imaging suggests acute cystitis   Urine culture growing multiple organisms  Empiric treatment for aspiration pneumonia with zosyn  Continue intravenous antibiotic(s)

## 2025-05-12 NOTE — ASSESSMENT & PLAN NOTE
Patient's most recent phosphorus results are listed below.   Recent Labs     05/11/25  0444 05/12/25  0538   PHOS 1.4* 1.7*     Plan  - Will treat hypophosphatemia with repletion as necessitated

## 2025-05-12 NOTE — HOSPITAL COURSE
5/12 admitted for septic shock with hypotension requiring pressor support, fever, tachycardia and leukocytosis. Patient's brother also noted gross hematuria. Imaging with possible aspiration vs early pneumonia, urinary bladder wall thickening and possible constipation.  Multiple organisms on urine culture. Blood cultures negative growth to date. Speech consulted and recommended npo, but family wish to pursue hospice. Continue treatment and discharge home on hospice tomorrow.    5/13  Leukocytosis improving. Procalcitonin mildly elevated. Fever curve trending down. Family signed consents for hospice. Per family, patient is ready to go home. Dme delivered to home per hospice.     No acute distress. No respiratory distress. Chronically ill appearing and debilitated with contractures. Family present    Patient seen and evaluated by me. Patient was determined to be suitable for discharge. Patient deemed stable for discharge to home with hospice.

## 2025-05-12 NOTE — PROGRESS NOTES
Inpatient Nutrition Assessment    Admit Date: 5/10/2025   Total duration of encounter: 2 days   Patient Age: 59 y.o.    Nutrition Recommendation/Prescription     Continue regular diet as tolerated with texture/consistency modifications per SLP recommendations.   Boost Plus BID and Anibal BID to increase kcal/protein intake for weight gain and wound healing.  Will monitor % intake meals + supplements, diet tolerance, bowel activity, weight, labs.     Communication of Recommendations: reviewed with family    Nutrition Assessment     Malnutrition Assessment/Nutrition-Focused Physical Exam       Malnutrition Level: other (see comments) (unable to determine) (05/12/25 1103)  Energy Intake (Malnutrition): other (see comments) (does not meet criteria per family report) (05/12/25 1103)  Weight Loss (Malnutrition):  (does not meet criteria per weight recordings in chart review) (05/12/25 1103)  Subcutaneous Fat (Malnutrition): other (see comments) (unable to assess, dietitian work remotely) (05/12/25 1103)           Muscle Mass (Malnutrition): other (see comments) (unable to assess - dietitian working remotely; temporal wasting noted per MD note) (05/12/25 1103)                          Fluid Accumulation (Malnutrition): other (see comments) (no edema per flowsheets) (05/12/25 1103)        A minimum of two characteristics is recommended for diagnosis of either severe or non-severe malnutrition.    Chart Review    Reason Seen: malnutrition screening tool (MST)    Malnutrition Screening Tool Results   Have you recently lost weight without trying?: Unsure  Have you been eating poorly because of a decreased appetite?: Yes   MST Score: 3   Diagnosis:  Septic shock  UTI  Multifocal pneumonia  Hematuria  Normocytic anemia  Hypophosphatemia   Hypokalemia     Relevant Medical History: muscular dystrophy    Scheduled Medications:  bisacodyL, 10 mg, Daily  piperacillin-tazobactam (Zosyn) IV (PEDS and ADULTS) (extended infusion is not  appropriate), 4.5 g, Q8H  potassium chloride, 10 mEq, Q1H    Continuous Infusions:   PRN Medications:  acetaminophen, 650 mg, Q6H PRN  acetaminophen, 650 mg, Q6H PRN  dextrose 50%, 12.5 g, PRN  dextrose 50%, 25 g, PRN  glucagon (human recombinant), 1 mg, PRN  glucose, 16 g, PRN  glucose, 24 g, PRN  hydrALAZINE, 5 mg, Q6H PRN  melatonin, 6 mg, Nightly PRN  naloxone, 0.02 mg, PRN  ondansetron, 4 mg, Q8H PRN  polyethylene glycol, 17 g, BID PRN  prochlorperazine, 2.5 mg, Q8H PRN  sodium chloride 0.9%, 10 mL, Q12H PRN    Calorie Containing IV Medications: no significant kcals from medications at this time    Recent Labs   Lab 05/10/25  2120 05/11/25  0444 05/12/25  0538    136 138   K 3.7 3.4* 3.2*   CALCIUM 8.2* 8.0* 7.7*   PHOS  --  1.4* 1.7*   MG  --  1.8 1.8    109 111*   CO2 19* 18* 20*   BUN 21* 10 5*   CREATININE 0.7 0.6 0.6   EGFRNORACEVR >60 >60 >60   * 136* 95   BILITOT 0.4 0.6 0.4   ALKPHOS 57 58 64   ALT 14 43 45*   AST 14 38 22   ALBUMIN 3.1* 2.8* 2.3*   WBC 27.46* 33.35* 31.57*   HGB 12.4* 11.1* 11.3*   HCT 37.5* 33.8* 35.3*     Nutrition Orders:  Diet Pureed (IDDSI Level 4)      Appetite/Oral Intake: poor/0-25% of meals  Factors Affecting Nutritional Intake: difficulty/impaired swallowing  Social Needs Impacting Access to Food: none identified  Food/[employer]/Cultural Preferences: none reported  Food Allergies: no known food allergies  Last Bowel Movement: 05/11/25  Wound(s):  per flowsheets:  Wound, right lateral hip  Wound, right upper scapula  Wound, right lateral back  Wound,left anterior knee  Wound, left lateral hip  Rash, right anterior palm  Rash, left anterior palm  Wound, right plantar foot  Wound, sacral spine    Comments    5/12/25: Dietitian working remotely, spoke with family via phone. Pt is currently on a pureed diet. Family reports pt with pretty good/improving appetite, feel like he is eating ~80% of normal. Family states pt with decreased intake when he first came to  the hospital but was otherwise eating well/at baseline at home PTA. Per flowsheet documentation, family is bringing food from home. Family reports pt's weight has been stable. Per chart review, pt with 4 lb weight gain since January 2025. Pt with temporal wasting per MD note. BMI 14.4 but noted that height measurement is estimated. NFPE to be completed by onsite RD at follow-up as able/needed. Pt does not consume ONS at home but family open to trying Boost while here. Will also send Anibal d/t multiple wounds documented in flowsheets. LBM 5/11. No edema per flowsheets.     Anthropometrics    Height: 5' (152.4 cm), Height Method: Estimated  Last Weight: 33.5 kg (73 lb 13.7 oz) (05/10/25 2109), Weight Method: Bed Scale   BMI: 14.42  BMI Classification: underweight (BMI less than 18.5)        Ideal Body Weight (IBW), Male: 106 lb                                Usual Weight Provided By: family/caregiver and EMR weight history    Wt Readings from Last 5 Encounters:   05/10/25 33.5 kg (73 lb 13.7 oz)   06/27/16 40.8 kg (90 lb)   04/18/16 54.4 kg (120 lb)     Weight Change(s) Since Admission:   Wt Readings from Last 1 Encounters:   05/10/25 2109 33.5 kg (73 lb 13.7 oz)   Admit Weight: 33.5 kg (73 lb 13.7 oz) (05/10/25 2109), Weight Method: Bed Scale    Estimated Needs    Weight Used For Calorie Calculations: 33.5 kg (73 lb 13.7 oz)  Energy Calorie Requirements (kcal): 4143-2400 kcal (30-35 kcal/kg) - underweight, musclar dystrophy  Energy Need Method: Kcal/kg  Weight Used For Protein Calculations: 33.5 kg (73 lb 13.7 oz)  Protein Requirements: 40-50 g (1.2-1.5 g/kg) - underweight, musclar dystrophy  Fluid Requirements (mL): 0080-3625 mL (1 mL/kcal) or per MD  CHO Requirement: 125-146 g (50% estimated needs)     Enteral Nutrition     Patient not receiving enteral nutrition at this time.    Parenteral Nutrition     Patient not receiving parenteral nutrition support at this time.    Evaluation of Received Nutrient  Intake    Calories: not meeting estimated needs  Protein: not meeting estimated needs    Patient Education     Not applicable.    Nutrition Diagnosis     PES: Inadequate energy intake related to inability to consume sufficient nutrients as evidenced by BMI 14. (new)       Nutrition Interventions     Intervention(s): modified composition of meals/snacks, commercial beverage, and collaboration with other providers  Intervention(s):      Goal: Meet greater than 80% of nutritional needs by follow-up. (new)  Goal: Meet greater than 80% of nutritional needs by follow-up. (new)    Nutrition Goals & Monitoring     Dietitian will monitor: food and beverage intake, energy intake, weight, food/nutrition knowledge skill, beliefs/attitudes, and gastrointestinal profile  Discharge planning: continue pureed diet with Boost oral supplements  Nutrition Risk/Follow-Up: patient at increased nutrition risk; dietitian will follow-up twice weekly   Please consult if re-assessment needed sooner.

## 2025-05-13 VITALS
HEART RATE: 113 BPM | RESPIRATION RATE: 19 BRPM | WEIGHT: 73.88 LBS | TEMPERATURE: 100 F | DIASTOLIC BLOOD PRESSURE: 57 MMHG | BODY MASS INDEX: 14.5 KG/M2 | OXYGEN SATURATION: 95 % | SYSTOLIC BLOOD PRESSURE: 97 MMHG | HEIGHT: 60 IN

## 2025-05-13 LAB
ABSOLUTE EOSINOPHIL (OHS): 1.07 K/UL
ABSOLUTE MONOCYTE (OHS): 1.38 K/UL (ref 0.3–1)
ABSOLUTE NEUTROPHIL COUNT (OHS): 16.26 K/UL (ref 1.8–7.7)
ALBUMIN SERPL BCP-MCNC: 2.5 G/DL (ref 3.5–5.2)
ALP SERPL-CCNC: 58 UNIT/L (ref 40–150)
ALT SERPL W/O P-5'-P-CCNC: 37 UNIT/L (ref 10–44)
ANION GAP (OHS): 9 MMOL/L (ref 8–16)
AST SERPL-CCNC: 22 UNIT/L (ref 11–45)
BASOPHILS # BLD AUTO: 0.05 K/UL
BASOPHILS NFR BLD AUTO: 0.2 %
BILIRUB SERPL-MCNC: 0.3 MG/DL (ref 0.1–1)
BUN SERPL-MCNC: 8 MG/DL (ref 6–20)
CALCIUM SERPL-MCNC: 8.6 MG/DL (ref 8.7–10.5)
CHLORIDE SERPL-SCNC: 107 MMOL/L (ref 95–110)
CO2 SERPL-SCNC: 22 MMOL/L (ref 23–29)
CREAT SERPL-MCNC: 0.6 MG/DL (ref 0.5–1.4)
ERYTHROCYTE [DISTWIDTH] IN BLOOD BY AUTOMATED COUNT: 12.5 % (ref 11.5–14.5)
GFR SERPLBLD CREATININE-BSD FMLA CKD-EPI: >60 ML/MIN/1.73/M2
GLUCOSE SERPL-MCNC: 95 MG/DL (ref 70–110)
HCT VFR BLD AUTO: 36.3 % (ref 40–54)
HGB BLD-MCNC: 12.3 GM/DL (ref 14–18)
IMM GRANULOCYTES # BLD AUTO: 0.17 K/UL (ref 0–0.04)
IMM GRANULOCYTES NFR BLD AUTO: 0.8 % (ref 0–0.5)
LYMPHOCYTES # BLD AUTO: 1.86 K/UL (ref 1–4.8)
MAGNESIUM SERPL-MCNC: 2.1 MG/DL (ref 1.6–2.6)
MCH RBC QN AUTO: 30.4 PG (ref 27–31)
MCHC RBC AUTO-ENTMCNC: 33.9 G/DL (ref 32–36)
MCV RBC AUTO: 90 FL (ref 82–98)
MRSA SPEC QL CULT: NORMAL
NUCLEATED RBC (/100WBC) (OHS): 0 /100 WBC
PHOSPHATE SERPL-MCNC: 1.4 MG/DL (ref 2.7–4.5)
PLATELET # BLD AUTO: 290 K/UL (ref 150–450)
PMV BLD AUTO: 9.2 FL (ref 9.2–12.9)
POTASSIUM SERPL-SCNC: 3.9 MMOL/L (ref 3.5–5.1)
PROCALCITONIN SERPL-MCNC: 0.53 NG/ML
PROT SERPL-MCNC: 6.7 GM/DL (ref 6–8.4)
RBC # BLD AUTO: 4.04 M/UL (ref 4.6–6.2)
RELATIVE EOSINOPHIL (OHS): 5.1 %
RELATIVE LYMPHOCYTE (OHS): 8.9 % (ref 18–48)
RELATIVE MONOCYTE (OHS): 6.6 % (ref 4–15)
RELATIVE NEUTROPHIL (OHS): 78.4 % (ref 38–73)
SODIUM SERPL-SCNC: 138 MMOL/L (ref 136–145)
WBC # BLD AUTO: 20.79 K/UL (ref 3.9–12.7)

## 2025-05-13 PROCEDURE — 80053 COMPREHEN METABOLIC PANEL: CPT | Performed by: STUDENT IN AN ORGANIZED HEALTH CARE EDUCATION/TRAINING PROGRAM

## 2025-05-13 PROCEDURE — 84100 ASSAY OF PHOSPHORUS: CPT | Performed by: STUDENT IN AN ORGANIZED HEALTH CARE EDUCATION/TRAINING PROGRAM

## 2025-05-13 PROCEDURE — 36415 COLL VENOUS BLD VENIPUNCTURE: CPT | Performed by: STUDENT IN AN ORGANIZED HEALTH CARE EDUCATION/TRAINING PROGRAM

## 2025-05-13 PROCEDURE — 84145 PROCALCITONIN (PCT): CPT | Performed by: FAMILY MEDICINE

## 2025-05-13 PROCEDURE — 85025 COMPLETE CBC W/AUTO DIFF WBC: CPT | Performed by: STUDENT IN AN ORGANIZED HEALTH CARE EDUCATION/TRAINING PROGRAM

## 2025-05-13 PROCEDURE — 25000003 PHARM REV CODE 250: Performed by: STUDENT IN AN ORGANIZED HEALTH CARE EDUCATION/TRAINING PROGRAM

## 2025-05-13 PROCEDURE — 25000003 PHARM REV CODE 250: Performed by: FAMILY MEDICINE

## 2025-05-13 PROCEDURE — 63600175 PHARM REV CODE 636 W HCPCS: Performed by: STUDENT IN AN ORGANIZED HEALTH CARE EDUCATION/TRAINING PROGRAM

## 2025-05-13 PROCEDURE — 83735 ASSAY OF MAGNESIUM: CPT | Performed by: STUDENT IN AN ORGANIZED HEALTH CARE EDUCATION/TRAINING PROGRAM

## 2025-05-13 RX ORDER — AMOXICILLIN AND CLAVULANATE POTASSIUM 875; 125 MG/1; MG/1
1 TABLET, FILM COATED ORAL 2 TIMES DAILY
Qty: 6 TABLET | Refills: 0 | Status: SHIPPED | OUTPATIENT
Start: 2025-05-13 | End: 2025-05-16

## 2025-05-13 RX ADMIN — PIPERACILLIN AND TAZOBACTAM 4.5 G: 4; .5 INJECTION, POWDER, LYOPHILIZED, FOR SOLUTION INTRAVENOUS; PARENTERAL at 04:05

## 2025-05-13 RX ADMIN — PIPERACILLIN AND TAZOBACTAM 4.5 G: 4; .5 INJECTION, POWDER, LYOPHILIZED, FOR SOLUTION INTRAVENOUS; PARENTERAL at 12:05

## 2025-05-13 RX ADMIN — BISACODYL 10 MG: 10 SUPPOSITORY RECTAL at 08:05

## 2025-05-13 NOTE — PLAN OF CARE
A208/A208 RAEANN Jeter is a 59 y.o.male admitted on 5/10/2025 for Septic shock   Code Status: DNR MRN: [id number]   Review of patient's allergies indicates:  No Known Allergies  Past Medical History:   Diagnosis Date    Muscular dystrophy       PRN meds    acetaminophen, 650 mg, Q6H PRN  acetaminophen, 650 mg, Q6H PRN  dextrose 50%, 12.5 g, PRN  dextrose 50%, 25 g, PRN  glucagon (human recombinant), 1 mg, PRN  glucose, 16 g, PRN  glucose, 24 g, PRN  hydrALAZINE, 5 mg, Q6H PRN  melatonin, 6 mg, Nightly PRN  naloxone, 0.02 mg, PRN  ondansetron, 4 mg, Q8H PRN  polyethylene glycol, 17 g, BID PRN  prochlorperazine, 2.5 mg, Q8H PRN  sodium chloride 0.9%, 10 mL, Q12H PRN      Pt alert oriented x4.    Plan of care reviewed. Patient's family verbalizes understanding.   Bed low, side rails up x 2, wheels locked, call light in reach.   Patient/Family instructed to call for assistance.  Patient education provided  Chart check completed. Will continue plan of care until discharge.        Orientation: disoriented to, time, place, situation  Jeff Coma Scale Score: 13     Lead Monitored: Lead II Rhythm: sinus tachycardia    Cardiac/Telemetry Box Number: [id number]  VTE Core Measure: Patient refused interventions Last Bowel Movement: 05/11/25  Diet Pureed (IDDSI Level 4)  Voiding Characteristics: incontinence, external catheter  Samuel Score: 11  Fall Risk Score: 19  Accucheck []   Freq?      Lines/Drains/Airways       Drain  Duration             Male External Urinary Catheter 05/10/25 2248 2 days              Peripheral Intravenous Line  Duration                  Peripheral IV - Single Lumen 05/10/25 2118 20 G Distal;Posterior;Right Forearm 2 days

## 2025-05-13 NOTE — PLAN OF CARE
[facility] - Telemetry (Hospital)  Discharge Final Note    Primary Care Provider: Kristy Primary Doctor    Expected Discharge Date: 5/13/2025    Final Discharge Note (most recent)       Final Note - 05/13/25 1249          Final Note    Assessment Type Final Discharge Note     Anticipated Discharge Disposition Hospice/Home        Post-Acute Status    Post-Acute Authorization Hospice     Hospice Status Set-up Complete/Auth obtained     Discharge Delays None known at this time                     Important Message from Medicare             After-discharge care                Home Medical Care       *[facility]   Service: Home Hospice    [id number] [place] [id number]   Phone: [phone]                     DC Disposition: [facility]  Family Notified: Patient's sister at bedside  Transportation: [facility] scheduled for 3:30 pm    Patient's agreeable to Hospice services via [facility]. Consents were signed and DME delivered to patient's home.  [facility] set up [facility] services and  scheduled for 3:30 pm.

## 2025-05-13 NOTE — ASSESSMENT & PLAN NOTE
"-59 y.o. male with muscular dystrophy who presented to ED for evaluation after after family noted concerns for hematuria.   -When EMS arrived, patient was noted to be hypotensive requiring Levophed to stabilize.  Brother reports that 3-4 days ago, patient was noted to have a fever which resolved after ibuprofen administration.    -ED course notable for vitals with tachycardia, hypotension.  Hospital course also notable for recurrent fevers   -Initial workup included: Labs notable for WBC 27.46, Urinalysis notable for WBC 20, RBC >100, blood 2+, ketones trace, glucose 3+, protein trace.  Chest x-ray nonacute.  CT C/A/P with IV contrast was completed with preliminary findings noted of  "patchy pulmonary opacities noted right lung compatible with multifocal right-sided pneumonia.  Aspiration not excluded in the correct clinical setting...."Urinary bladder wall thickening compatible with cystitis""      This patient does have evidence of infective focus  My overall impression is septic shock due to SBP < 90.  Required Levophed administration during EMS course.    Source: Urinary Tract versus Respiratory tract    Latest lactate reviewed-  Recent Labs   Lab 05/10/25  2120 05/11/25  0131   LACTATE 2.2 1.0     PLAN  - Follow up final read of CT imaging  - Follow-up blood cultures, urine cultures and deescalate antibiotics as appropriate  - Telemetry. Strict I/Os   -Source control achieved by:  Antibiotics  Antibiotics (From admission, onward)      Start     Stop Route Frequency Ordered    05/11/25 0345  piperacillin-tazobactam (ZOSYN) 4.5 g in D5W 100 mL IVPB (MB+)         -- IV Every 8 hours (non-standard times) 05/11/25 0230          Antibiotics (From admission, onward)      Start     Stop Route Frequency Ordered    05/11/25 0345  piperacillin-tazobactam (ZOSYN) 4.5 g in D5W 100 mL IVPB (MB+)         -- IV Every 8 hours (non-standard times) 05/11/25 0230          Microbiology Results (last 7 days)       Procedure " Component Value Units Date/Time    Culture, MRSA [[id number]] Collected: 05/11/25 0441    Order Status: Completed Specimen: MRSA source from Nares, Left Updated: 05/13/25 0730     Culture, MRSA No MRSA isolated.    Blood culture x two cultures. Draw prior to antibiotics. [[id number]]  (Normal) Collected: 05/10/25 2119    Order Status: Completed Specimen: Blood from Peripheral, Forearm, Left Updated: 05/13/25 0426     Blood Culture No Growth After 48 Hours    Blood culture x two cultures. Draw prior to antibiotics. [[id number]]  (Normal) Collected: 05/10/25 2121    Order Status: Completed Specimen: Blood from Peripheral, Forearm, Right Updated: 05/13/25 0426     Blood Culture No Growth After 48 Hours    Urine culture [[id number]] Collected: 05/10/25 2126    Order Status: Completed Specimen: Urine Updated: 05/12/25 0741     Urine Culture Multiple organisms isolated. None in predominance. Repeat if clinically necessary.          Microbiology Results (last 7 days)       Procedure Component Value Units Date/Time    Culture, MRSA [[id number]] Collected: 05/11/25 0441    Order Status: Completed Specimen: MRSA source from Nares, Left Updated: 05/13/25 0730     Culture, MRSA No MRSA isolated.    Blood culture x two cultures. Draw prior to antibiotics. [[id number]]  (Normal) Collected: 05/10/25 2119    Order Status: Completed Specimen: Blood from Peripheral, Forearm, Left Updated: 05/13/25 0426     Blood Culture No Growth After 48 Hours    Blood culture x two cultures. Draw prior to antibiotics. [[id number]]  (Normal) Collected: 05/10/25 2121    Order Status: Completed Specimen: Blood from Peripheral, Forearm, Right Updated: 05/13/25 0426     Blood Culture No Growth After 48 Hours    Urine culture [[id number]] Collected: 05/10/25 2126    Order Status: Completed Specimen: Urine Updated: 05/12/25 0741     Urine Culture Multiple organisms isolated. None in predominance. Repeat if clinically necessary.        Ct imaging showing  urinary bladder wall thickening  Urine culture with multiple organisms   Blood cultures negative growth to date   Continue intravenous antibiotic(s)   Suspect aspiration   Aspiration risk  Consider systemic steroids pending procalcitonin   Procalcitonin in am   Deescalate antibiotic(s) as able

## 2025-05-13 NOTE — ASSESSMENT & PLAN NOTE
Patient's most recent phosphorus results are listed below.   Recent Labs     05/11/25  0444 05/12/25  0538 05/13/25  0505   PHOS 1.4* 1.7* 1.4*     Plan  - Will treat hypophosphatemia with repletion as necessitated

## 2025-05-13 NOTE — ASSESSMENT & PLAN NOTE
"-59 y.o. male with muscular dystrophy who presented to ED for evaluation after after family noted concerns for hematuria.   -When EMS arrived, patient was noted to be hypotensive requiring Levophed to stabilize.  Brother reports that 3-4 days ago, patient was noted to have a fever which resolved after ibuprofen administration.    -ED course notable for vitals with tachycardia, hypotension.  Hospital course also notable for recurrent fevers   -Initial workup included: Labs notable for WBC 27.46, Urinalysis notable for WBC 20, RBC >100, blood 2+, ketones trace, glucose 3+, protein trace.  Chest x-ray nonacute.  CT C/A/P with IV contrast was completed with preliminary findings noted of  "patchy pulmonary opacities noted right lung compatible with multifocal right-sided pneumonia.  Aspiration not excluded in the correct clinical setting...."Urinary bladder wall thickening compatible with cystitis""      This patient does have evidence of infective focus  My overall impression is septic shock due to SBP < 90.  Required Levophed administration during EMS course.    Source: Urinary Tract versus Respiratory tract    Latest lactate reviewed-  Recent Labs   Lab 05/10/25  2120 05/11/25  0131   LACTATE 2.2 1.0     PLAN  - Follow up final read of CT imaging  - Follow-up blood cultures, urine cultures and deescalate antibiotics as appropriate  - Telemetry. Strict I/Os   -Source control achieved by:  Antibiotics  Antibiotics (From admission, onward)      Start     Stop Route Frequency Ordered    05/11/25 0345  piperacillin-tazobactam (ZOSYN) 4.5 g in D5W 100 mL IVPB (MB+)         -- IV Every 8 hours (non-standard times) 05/11/25 0230          Antibiotics (From admission, onward)      Start     Stop Route Frequency Ordered    05/11/25 0345  piperacillin-tazobactam (ZOSYN) 4.5 g in D5W 100 mL IVPB (MB+)         -- IV Every 8 hours (non-standard times) 05/11/25 0230          Microbiology Results (last 7 days)       Procedure " Component Value Units Date/Time    Culture, MRSA [[id number]] Collected: 05/11/25 0441    Order Status: Completed Specimen: MRSA source from Nares, Left Updated: 05/13/25 0730     Culture, MRSA No MRSA isolated.    Blood culture x two cultures. Draw prior to antibiotics. [[id number]]  (Normal) Collected: 05/10/25 2119    Order Status: Completed Specimen: Blood from Peripheral, Forearm, Left Updated: 05/13/25 0426     Blood Culture No Growth After 48 Hours    Blood culture x two cultures. Draw prior to antibiotics. [[id number]]  (Normal) Collected: 05/10/25 2121    Order Status: Completed Specimen: Blood from Peripheral, Forearm, Right Updated: 05/13/25 0426     Blood Culture No Growth After 48 Hours    Urine culture [[id number]] Collected: 05/10/25 2126    Order Status: Completed Specimen: Urine Updated: 05/12/25 0741     Urine Culture Multiple organisms isolated. None in predominance. Repeat if clinically necessary.          Microbiology Results (last 7 days)       Procedure Component Value Units Date/Time    Culture, MRSA [[id number]] Collected: 05/11/25 0441    Order Status: Completed Specimen: MRSA source from Nares, Left Updated: 05/13/25 0730     Culture, MRSA No MRSA isolated.    Blood culture x two cultures. Draw prior to antibiotics. [[id number]]  (Normal) Collected: 05/10/25 2119    Order Status: Completed Specimen: Blood from Peripheral, Forearm, Left Updated: 05/13/25 0426     Blood Culture No Growth After 48 Hours    Blood culture x two cultures. Draw prior to antibiotics. [[id number]]  (Normal) Collected: 05/10/25 2121    Order Status: Completed Specimen: Blood from Peripheral, Forearm, Right Updated: 05/13/25 0426     Blood Culture No Growth After 48 Hours    Urine culture [[id number]] Collected: 05/10/25 2126    Order Status: Completed Specimen: Urine Updated: 05/12/25 0741     Urine Culture Multiple organisms isolated. None in predominance. Repeat if clinically necessary.        Ct imaging showing  urinary bladder wall thickening  Urine culture with multiple organisms   Blood cultures negative growth to date   Continue intravenous antibiotic(s)   Suspect aspiration   Aspiration risk  Consider systemic steroids pending procalcitonin   Procalcitonin in am   Deescalate antibiotic(s) as able

## 2025-05-13 NOTE — ASSESSMENT & PLAN NOTE
"-59 y.o. male with muscular dystrophy who presented to ED for evaluation after after family noted concerns for hematuria.   -When EMS arrived, patient was noted to be hypotensive requiring Levophed to stabilize.  Brother reports that 3-4 days ago, patient was noted to have a fever which resolved after ibuprofen administration.    -ED course notable for vitals with tachycardia, hypotension.  Hospital course also notable for recurrent fevers   -Initial workup included: Labs notable for WBC 27.46, Urinalysis notable for WBC 20, RBC >100, blood 2+, ketones trace, glucose 3+, protein trace.  Chest x-ray nonacute.  CT C/A/P with IV contrast was completed with preliminary findings noted of  "patchy pulmonary opacities noted right lung compatible with multifocal right-sided pneumonia.  Aspiration not excluded in the correct clinical setting...."Urinary bladder wall thickening compatible with cystitis""      This patient does have evidence of infective focus  My overall impression is septic shock due to SBP < 90.  Required Levophed administration during EMS course.    Source: Urinary Tract versus Respiratory tract    Latest lactate reviewed-  Recent Labs   Lab 05/10/25  2120 05/11/25  0131   LACTATE 2.2 1.0     PLAN  - Follow up final read of CT imaging  - Follow-up blood cultures, urine cultures and deescalate antibiotics as appropriate  - Telemetry. Strict I/Os   -Source control achieved by:  Antibiotics  Antibiotics (From admission, onward)      Start     Stop Route Frequency Ordered    05/11/25 0345  piperacillin-tazobactam (ZOSYN) 4.5 g in D5W 100 mL IVPB (MB+)         -- IV Every 8 hours (non-standard times) 05/11/25 0230          Antibiotics (From admission, onward)      Start     Stop Route Frequency Ordered    05/11/25 0345  piperacillin-tazobactam (ZOSYN) 4.5 g in D5W 100 mL IVPB (MB+)         -- IV Every 8 hours (non-standard times) 05/11/25 0230          Microbiology Results (last 7 days)       Procedure " Component Value Units Date/Time    Culture, MRSA [[id number]] Collected: 05/11/25 0441    Order Status: Completed Specimen: MRSA source from Nares, Left Updated: 05/13/25 0730     Culture, MRSA No MRSA isolated.    Blood culture x two cultures. Draw prior to antibiotics. [[id number]]  (Normal) Collected: 05/10/25 2119    Order Status: Completed Specimen: Blood from Peripheral, Forearm, Left Updated: 05/13/25 0426     Blood Culture No Growth After 48 Hours    Blood culture x two cultures. Draw prior to antibiotics. [[id number]]  (Normal) Collected: 05/10/25 2121    Order Status: Completed Specimen: Blood from Peripheral, Forearm, Right Updated: 05/13/25 0426     Blood Culture No Growth After 48 Hours    Urine culture [[id number]] Collected: 05/10/25 2126    Order Status: Completed Specimen: Urine Updated: 05/12/25 0741     Urine Culture Multiple organisms isolated. None in predominance. Repeat if clinically necessary.          Microbiology Results (last 7 days)       Procedure Component Value Units Date/Time    Culture, MRSA [[id number]] Collected: 05/11/25 0441    Order Status: Completed Specimen: MRSA source from Nares, Left Updated: 05/13/25 0730     Culture, MRSA No MRSA isolated.    Blood culture x two cultures. Draw prior to antibiotics. [[id number]]  (Normal) Collected: 05/10/25 2119    Order Status: Completed Specimen: Blood from Peripheral, Forearm, Left Updated: 05/13/25 0426     Blood Culture No Growth After 48 Hours    Blood culture x two cultures. Draw prior to antibiotics. [[id number]]  (Normal) Collected: 05/10/25 2121    Order Status: Completed Specimen: Blood from Peripheral, Forearm, Right Updated: 05/13/25 0426     Blood Culture No Growth After 48 Hours    Urine culture [[id number]] Collected: 05/10/25 2126    Order Status: Completed Specimen: Urine Updated: 05/12/25 0741     Urine Culture Multiple organisms isolated. None in predominance. Repeat if clinically necessary.        Ct imaging showing  urinary bladder wall thickening  Urine culture with multiple organisms   Blood cultures negative growth to date   Continue intravenous antibiotic(s)   Suspect aspiration   Aspiration risk  Consider systemic steroids pending procalcitonin   Procalcitonin in am   Deescalate antibiotic(s) as able

## 2025-05-13 NOTE — NURSING
Discharge instructions received and reviewed with pt and family at bedside.  family voiced understanding and all questions answered to satisfaction.  Stressed importance to making and keeping all follow up appointments.  Medications sent to pt pharmacy and reviewed with pt and family  Tele monitor removed and brought to monitor tech.  IV d/c'd with tip intact, pressure dressing applied.  Pt transported to front of hospital via stretcher by [facility] to be discharged to hospice.

## 2025-05-13 NOTE — ASSESSMENT & PLAN NOTE
Patient's most recent potassium results are listed below.   Recent Labs     05/11/25  0444 05/12/25  0538 05/13/25  0505   K 3.4* 3.2* 3.9     Plan  - Replete potassium per protocol  - Monitor potassium Daily

## 2025-05-13 NOTE — DISCHARGE SUMMARY
"[facility], [place], operated by [facility]  Discharge Summary      Patient Name: Godwin Jeter  MRN: [id number]  [facility]: [id number]  Patient Class: IP- Inpatient  Admission Date: 5/10/2025  Hospital Length of Stay: 2 days  Discharge Date and Time: 05/13/2025 2:41 PM  Attending Physician: Evangelina Morris MD   Discharging Provider: Evangelina Morris MD  Primary Care Provider: Kristy, Primary Doctor    Primary Care Team: Networked reference to record PCT     HPI:   Patient information was obtained from relative(youngest brother), past medical records, and ER records.      Mr. Godwin Jeter is a 59 y.o. male who  has a past medical history of Muscular dystrophy.    He  presented to ED for evaluation after after family noted concerns for hematuria.  When EMS arrived, patient was noted to be hypotensive requiring Levophed to stabilize.  Brother reports that 3-4 days ago, patient was noted to have a fever which resolved after ibuprofen administration.  Brother denies any recent changes in patient other than hematuria.  Denies any upper or lower respiratory symptoms, or abdominal symptoms. At baseline, patient is bed-bound, fully dependent and requires assistance for his ADL, usually eats a pureed diet, uses a condom catheter, has very limited verbal ability to communicate.  He does not take any home medications.  Per brother, no recent concerns for aspiration or dysphagia, patient is at baseline mentation and is able to tolerate a pureed-like food diet  at home which typically consists of rice and soft foods.     ED course notable for vitals with tachycardia, hypotension.  Labs notable for WBC 27.46, hemoglobin 12.4, bicarb 19, glucose 225, calcium 8.2, albumin 3.1.  Urinalysis notable for WBC 20, RBC >100, blood 2+, ketones trace, glucose 3+, protein trace.  Chest x-ray nonacute.  CT C/A/P with IV contrast was completed with preliminary findings noted of  "patchy pulmonary opacities noted right lung compatible with multifocal " "right-sided pneumonia.  Aspiration not excluded in the correct clinical setting...."Urinary bladder wall thickening compatible with cystitis""      * No surgery found *      Hospital Course:   5/12 admitted for septic shock with hypotension requiring pressor support, fever, tachycardia and leukocytosis. Patient's brother also noted gross hematuria. Imaging with possible aspiration vs early pneumonia, urinary bladder wall thickening and possible constipation.  Multiple organisms on urine culture. Blood cultures negative growth to date. Speech consulted and recommended npo, but family wish to pursue hospice. Continue treatment and discharge home on hospice tomorrow.    5/13  Leukocytosis improving. Procalcitonin mildly elevated. Fever curve trending down. Family signed consents for hospice. Per family, patient is ready to go home. Dme delivered to home per hospice.     No acute distress. No respiratory distress. Chronically ill appearing and debilitated with contractures. Family present    Patient seen and evaluated by me. Patient was determined to be suitable for discharge. Patient deemed stable for discharge to home with hospice.      Goals of Care Treatment Preferences:  Code Status: DNR          What is most important right now is to focus on symptom/pain control.  Accordingly, we have decided that the best plan to meet the patient's goals includes continuing with treatment.      SDOH Screening:  The patient was unable to be screened for utility difficulties, food insecurity, transport difficulties, housing insecurity, and interpersonal safety, so no concerns could be identified this admission.     Consults:   Consults (From admission, onward)          Status Ordering Provider     Inpatient consult to Social Work/Case Management  Once        Provider:  (Not yet assigned)    Completed GUILLERMO GUAN            Assessment & Plan  Septic shock  UTI (urinary tract infection)  Multifocal pneumonia  -59 y.o. male with " "muscular dystrophy who presented to ED for evaluation after after family noted concerns for hematuria.   -When EMS arrived, patient was noted to be hypotensive requiring Levophed to stabilize.  Brother reports that 3-4 days ago, patient was noted to have a fever which resolved after ibuprofen administration.    -ED course notable for vitals with tachycardia, hypotension.  Hospital course also notable for recurrent fevers   -Initial workup included: Labs notable for WBC 27.46, Urinalysis notable for WBC 20, RBC >100, blood 2+, ketones trace, glucose 3+, protein trace.  Chest x-ray nonacute.  CT C/A/P with IV contrast was completed with preliminary findings noted of  "patchy pulmonary opacities noted right lung compatible with multifocal right-sided pneumonia.  Aspiration not excluded in the correct clinical setting...."Urinary bladder wall thickening compatible with cystitis""      This patient does have evidence of infective focus  My overall impression is septic shock due to SBP < 90.  Required Levophed administration during EMS course.    Source: Urinary Tract versus Respiratory tract    Latest lactate reviewed-  Recent Labs   Lab 05/10/25  2120 05/11/25  0131   LACTATE 2.2 1.0     PLAN  - Follow up final read of CT imaging  - Follow-up blood cultures, urine cultures and deescalate antibiotics as appropriate  - Telemetry. Strict I/Os   -Source control achieved by:  Antibiotics  Antibiotics (From admission, onward)      Start     Stop Route Frequency Ordered    05/11/25 0345  piperacillin-tazobactam (ZOSYN) 4.5 g in D5W 100 mL IVPB (MB+)         -- IV Every 8 hours (non-standard times) 05/11/25 0230          Antibiotics (From admission, onward)      Start     Stop Route Frequency Ordered    05/11/25 0345  piperacillin-tazobactam (ZOSYN) 4.5 g in D5W 100 mL IVPB (MB+)         -- IV Every 8 hours (non-standard times) 05/11/25 0230          Microbiology Results (last 7 days)       Procedure Component Value Units " Date/Time    Culture, MRSA [[id number]] Collected: 05/11/25 0441    Order Status: Completed Specimen: MRSA source from Nares, Left Updated: 05/13/25 0730     Culture, MRSA No MRSA isolated.    Blood culture x two cultures. Draw prior to antibiotics. [[id number]]  (Normal) Collected: 05/10/25 2119    Order Status: Completed Specimen: Blood from Peripheral, Forearm, Left Updated: 05/13/25 0426     Blood Culture No Growth After 48 Hours    Blood culture x two cultures. Draw prior to antibiotics. [[id number]]  (Normal) Collected: 05/10/25 2121    Order Status: Completed Specimen: Blood from Peripheral, Forearm, Right Updated: 05/13/25 0426     Blood Culture No Growth After 48 Hours    Urine culture [[id number]] Collected: 05/10/25 2126    Order Status: Completed Specimen: Urine Updated: 05/12/25 0741     Urine Culture Multiple organisms isolated. None in predominance. Repeat if clinically necessary.          Microbiology Results (last 7 days)       Procedure Component Value Units Date/Time    Culture, MRSA [[id number]] Collected: 05/11/25 0441    Order Status: Completed Specimen: MRSA source from Nares, Left Updated: 05/13/25 0730     Culture, MRSA No MRSA isolated.    Blood culture x two cultures. Draw prior to antibiotics. [[id number]]  (Normal) Collected: 05/10/25 2119    Order Status: Completed Specimen: Blood from Peripheral, Forearm, Left Updated: 05/13/25 0426     Blood Culture No Growth After 48 Hours    Blood culture x two cultures. Draw prior to antibiotics. [[id number]]  (Normal) Collected: 05/10/25 2121    Order Status: Completed Specimen: Blood from Peripheral, Forearm, Right Updated: 05/13/25 0426     Blood Culture No Growth After 48 Hours    Urine culture [[id number]] Collected: 05/10/25 2126    Order Status: Completed Specimen: Urine Updated: 05/12/25 0741     Urine Culture Multiple organisms isolated. None in predominance. Repeat if clinically necessary.        Ct imaging showing urinary bladder wall  thickening  Urine culture with multiple organisms   Blood cultures negative growth to date   Continue intravenous antibiotic(s)   Suspect aspiration   Aspiration risk  Consider systemic steroids pending procalcitonin   Procalcitonin in am   Deescalate antibiotic(s) as able       Hematuria  Likely secondary to UTI.  Resolution noted on presentation with clear appearing urine in collection.    PLAN:  No further episodes of hematuria  Ct imaging suggests acute cystitis   Urine culture growing multiple organisms  Empiric treatment for aspiration pneumonia with zosyn  Continue intravenous antibiotic(s)       Normocytic anemia  -Patient's with Normocytic anemia..   -Hemoglobin downtrending.   -Etiology likely due to possibly nutritional etiology versus recent hematuria concerns.  -Current CBC reviewed-    Recent Labs   Lab 05/11/25 0444 05/12/25  0538 05/13/25  0505   HGB 11.1* 11.3* 12.3*         PLAN  - Monitor serial CBC and transfuse if patient becomes hemodynamically unstable, symptomatic or H/H drops below 7/21.  Iron studies with low iron and iron sat  Supplement with iron once infection ruled out    Hypophosphatemia  Patient's most recent phosphorus results are listed below.   Recent Labs     05/11/25  0444 05/12/25  0538 05/13/25  0505   PHOS 1.4* 1.7* 1.4*     Plan  - Will treat hypophosphatemia with repletion as necessitated  Hypokalemia  Patient's most recent potassium results are listed below.   Recent Labs     05/11/25  0444 05/12/25  0538 05/13/25  0505   K 3.4* 3.2* 3.9     Plan  - Replete potassium per protocol  - Monitor potassium Daily  At high risk for aspiration  Family does not want to restrict patient's diet  Hospice referral placed     Final Active Diagnoses:    Diagnosis Date Noted POA    PRINCIPAL PROBLEM:  Septic shock [A41.9, R65.21] 05/11/2025 Yes    UTI (urinary tract infection) [N39.0] 05/11/2025 Yes    Hematuria [R31.9] 05/11/2025 Yes    Multifocal pneumonia [J18.9] 05/11/2025 Yes     Normocytic anemia [D64.9] 05/11/2025 Yes    Hypophosphatemia [E83.39] 05/11/2025 Yes    Hypokalemia [E87.6] 05/11/2025 Yes    At high risk for aspiration [Z91.89] 05/11/2025 Yes      Problems Resolved During this Admission:       Discharged Condition: stable    Disposition: Hospice/Home    Follow Up:   Contact information for after-discharge care       Home Medical Care       [facility] .    Service: Home Hospice  Contact information:  [id number] [facility] [address] [phone]                                 Patient Instructions:   No discharge procedures on file.    Significant Diagnostic Studies: Labs: CMP   Recent Labs   Lab 05/12/25  0538 05/13/25  0505    138   K 3.2* 3.9   * 107   CO2 20* 22*   GLU 95 95   BUN 5* 8   CREATININE 0.6 0.6   CALCIUM 7.7* 8.6*   PROT 6.0 6.7   ALBUMIN 2.3* 2.5*   BILITOT 0.4 0.3   ALKPHOS 64 58   AST 22 22   ALT 45* 37   ANIONGAP 7* 9   , CBC   Recent Labs   Lab 05/12/25  0538 05/13/25  0505   WBC 31.57* 20.79*   HGB 11.3* 12.3*   HCT 35.3* 36.3*    290   , All labs within the past 24 hours have been reviewed, and procalcitonin 0.53  Microbiology: Blood Culture   Lab Results   Component Value Date    LABBLOO No growth after 5 days. 04/18/2016   , Urine Culture    Lab Results   Component Value Date    LABURIN  05/10/2025     Multiple organisms isolated. None in predominance. Repeat if clinically necessary.   , and blood cultures negative growth to date x 48h  Mrsa negative   Radiology: X-Ray: CXR: portable showing air filled stomach in upper stomach; no acute process  CT scan: CT chest abdomen pelvis with contrast showing possible early pneumonia or aspiration, urinary wall thickening and possible constipation    Pending Diagnostic Studies:       Procedure Component Value Units Date/Time    HCV Virus Hold Specimen [[id number]] Collected: 05/10/25 2120    Order Status: Sent Lab Status: In process Updated: 05/10/25 2130     Specimen: Blood            Medications:  Reconciled Home Medications:      Medication List        START taking these medications      amoxicillin-clavulanate 875-125mg 875-125 mg per tablet  Commonly known as: AUGMENTIN  Take 1 tablet by mouth 2 (two) times daily. for 3 days            STOP taking these medications      bisacodyL 10 mg Supp  Commonly known as: DULCOLAX     docusate sodium 100 MG capsule  Commonly known as: COLACE     metoclopramide HCl 5 MG tablet  Commonly known as: REGLAN     polyethylene glycol 17 gram/dose powder  Commonly known as: GLYCOLAX              Indwelling Lines/Drains at time of discharge:   Lines/Drains/Airways       None                   Time spent on the discharge of patient: 31 minutes         Evangelina Morris MD  Department of Hospital Medicine  [facility] - Telemetry ([facility])

## 2025-05-13 NOTE — ASSESSMENT & PLAN NOTE
-Patient's with Normocytic anemia..   -Hemoglobin downtrending.   -Etiology likely due to possibly nutritional etiology versus recent hematuria concerns.  -Current CBC reviewed-    Recent Labs   Lab 05/11/25  0444 05/12/25  0538 05/13/25  0505   HGB 11.1* 11.3* 12.3*         PLAN  - Monitor serial CBC and transfuse if patient becomes hemodynamically unstable, symptomatic or H/H drops below 7/21.  Iron studies with low iron and iron sat  Supplement with iron once infection ruled out

## 2025-05-13 NOTE — TREATMENT PLAN
Family agreed to make patient DNR and focus on comfort care measures. Patient awaiting transfer to hospice tomorrow.

## 2025-05-15 LAB — POCT GLUCOSE: 119 MG/DL (ref 70–110)

## 2025-05-16 LAB
BACTERIA BLD CULT: NORMAL
BACTERIA BLD CULT: NORMAL